# Patient Record
Sex: MALE | Race: WHITE
[De-identification: names, ages, dates, MRNs, and addresses within clinical notes are randomized per-mention and may not be internally consistent; named-entity substitution may affect disease eponyms.]

---

## 2017-01-16 ENCOUNTER — HOSPITAL ENCOUNTER (EMERGENCY)
Dept: HOSPITAL 58 - ED | Age: 32
Discharge: HOME | End: 2017-01-16

## 2017-01-16 VITALS — TEMPERATURE: 98.4 F | DIASTOLIC BLOOD PRESSURE: 88 MMHG | SYSTOLIC BLOOD PRESSURE: 145 MMHG

## 2017-01-16 VITALS — BODY MASS INDEX: 22.3 KG/M2

## 2017-01-16 DIAGNOSIS — S63.91XA: ICD-10-CM

## 2017-01-16 DIAGNOSIS — F17.210: ICD-10-CM

## 2017-01-16 DIAGNOSIS — S62.336A: Primary | ICD-10-CM
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
## 2017-01-16 PROCEDURE — 99283 EMERGENCY DEPT VISIT LOW MDM: CPT

## 2017-01-16 NOTE — ED.PDOC
General


ED Provider: 


Dr. GERALDO BARRAGAN





Chief Complaint: Hand Pain/Injury


Stated Complaint: right hand, wrist pain


Time Seen by Physician: 16:14 (fall)


Mode of Arrival: Walk-In


Information Source: Patient


Nursing and Triage Documentation Reviewed and Agree: Yes





Review of Systems





- Review Of Systems


Constitutional: Reports: No symptoms


Eyes: Reports: No symptoms


Ears, Nose, Mouth, Throat: Reports: No symptoms


Respiratory: Reports: No symptoms


Cardiac: Reports: No symptoms


GI: Reports: No symptoms


: Reports: No symptoms


Musculoskeletal: Reports: Joint pain (right hand , wrist)


Skin: Reports: No symptoms


Neurological: Reports: No symptoms


Endocrine: Reports: No symptoms


Hematologic/Lymphatic: Reports: No symptoms


All Other Systems: Reviewed and Negative





Past Medical History





- Past Medical History


Previously Healthy: Yes


Endocrine: Reports: None


Cardiovascular: Reports: None


Respiratory: Reports: None


Hematological: Reports: None


Gastrointestinal: Reports: None


Genitourinary: Reports: None


Neuro/Psych: Reports: None


Musculoskeletal: Reports: None


Cancer: Reports: None





- Surgical History


General Surgical History: Reports: None





- Family History


Family History: Reports: Unknown





- Social History


Smoking Status: Current some day smoker


Hx Substance Use: No


Alcohol Screening: None





- Immunizations


Tetanus Shot up to Date: Yes





Physical Exam





- Physical Exam


Appearance: Well-appearing, No pain distress, Well-nourished


Eyes: MELISSA, EOMI, Conjunctiva clear


ENT: Ears normal, Nose normal, Oropharynx normal


Respiratory: Airway patent, Breath sounds clear, Breath sounds equal, 

Respirations nonlabored


Cardiovascular: RRR, Pulses normal, No rub, No murmur


GI/: Soft, Nontender, No masses, Bowel sounds normal, No Organomegaly


Musculoskeletal: Limited ROM (4th, 5th  digits tender limited range of motion , 

right elbow and shoulder are WNL)


Skin: Warm, Dry, Normal color


Neurological: Sensation intact, Motor intact, Reflexes intact, Cranial nerves 

intact, Alert, Oriented


Psychiatric: Affect appropriate, Mood appropriate





Critical Care Note





- Critical Care Note


Total Time (mins): 0





Course





- Course


Orders, Labs, Meds: 


Orders











 Category Date Time Status


 


 HAND, RIGHT 3 VIEWS Stat RADS  01/16/17 16:42 Ordered


 


 WRIST, RIGHT 3 VIEWS Stat RADS  01/16/17 16:42 Ordered











Vital Signs: 


 











  Temp Pulse Resp BP Pulse Ox


 


 01/16/17 16:14  98.4 F  87  16  145/88 H  98














Departure





- Departure


Time of Disposition: 18:00 (BOXERS FRACTURE DISCUSSED AND FILM SHOWN TO PT 

FERMÍN GARCIA PRESENT ALONG WITH MARILYNN PAULINO GIVEN)


Disposition: HOME SELF-CARE


Discharge Problem: 


 Injury of hand, Boxer's fracture





Sprain of hand, right


Qualifiers:


 Encounter type: initial encounter Qualifier Code: (S63.91XA) Sprain of 

unspecified part of right wrist and hand, initial encounter





Sprain of wrist, right


Qualifiers:


 Encounter type: initial encounter Qualifier Code: (S63.501A) Unspecified 

sprain of right wrist, initial encounter





Instructions:  Hand Sprain (ED), Wrist Sprain (ED), Wrist Fracture in Adults (ED

)


Condition: Good


Pt referred to PMD for follow-up: No


Additional Instructions: 


Please call your Family Physician as soon as possible to schedule a follow-up 

appointment.YOU MUST SEE CLINIC ASAP  YOU HAVE A BOXERS TYPE FRACTURE


Prescriptions: 


Hydrocodone/Acetaminophen [Norco 5-325 Tablet] 1 each PO Q6HR PRN #12 tablet


 PRN Reason: PAIN


Allergies/Adverse Reactions: 


Allergies





No Known Allergies Allergy (Unverified 10/21/14 06:27)


 








Home Medications: 


Ambulatory Orders





Hydrocodone/Acetaminophen [Norco 5-325 Tablet] 1 each PO Q6HR PRN #12 tablet 01/ 16/17

## 2017-01-17 NOTE — DI
EXAM:  Right hand three-view 

  

HISTORY:  Trauma, fall 

  

COMPARISON:  None 

  

FINDINGS:  There is a mildly displaced fracture of the proximal aspect fifth metacarpal with mild vo
lar angulation of the distal fracture fragment. Remainder of the bones and joints are normal. There 
is soft tissue swelling laterally. 

  

IMPERSSION:  Mildly displaced fracture of the fifth metacarpal. 

  

Report faxed at time of dictation.

## 2017-01-17 NOTE — DI
EXAM:  Right wrist three-view 

  

HISTORY:  Fall 

  

COMPARISON:  None 

  

FINDINGS:  There is a mildly displaced fracture of the proximal aspect fifth metacarpal with mild vo
lar angulation of the distal fracture fragment. Remainder of the bones and joints are normal. There 
is soft tissue swelling laterally. 

  

IMPERSSION:  Mildly displaced fracture of the fifth metacarpal.

## 2017-09-16 ENCOUNTER — HOSPITAL ENCOUNTER (EMERGENCY)
Dept: HOSPITAL 58 - ED | Age: 32
Discharge: HOME | End: 2017-09-16

## 2017-09-16 VITALS — BODY MASS INDEX: 20 KG/M2

## 2017-09-16 VITALS — SYSTOLIC BLOOD PRESSURE: 113 MMHG | DIASTOLIC BLOOD PRESSURE: 52 MMHG | TEMPERATURE: 98.9 F

## 2017-09-16 DIAGNOSIS — J20.9: Primary | ICD-10-CM

## 2017-09-16 DIAGNOSIS — J06.9: ICD-10-CM

## 2017-09-16 DIAGNOSIS — F17.210: ICD-10-CM

## 2017-09-16 LAB
ALBUMIN SERPL-MCNC: 4.6 G/DL (ref 3.4–5)
ALBUMIN/GLOB SERPL: 1.39 {RATIO}
ALP SERPL-CCNC: 85 U/L (ref 50–136)
ALT SERPL-CCNC: 12 U/L (ref 12–78)
ANION GAP SERPL CALC-SCNC: 17.6 MMOL/L
AST SERPL-CCNC: 15 U/L (ref 15–37)
BASOPHILS # BLD AUTO: 0 K/UL (ref 0–0.2)
BASOPHILS NFR BLD AUTO: 0.5 % (ref 0–3)
BILIRUB SERPL-MCNC: 0.27 MG/DL (ref 0–1.2)
BUN SERPL-MCNC: 10 MG/DL (ref 7–18)
BUN/CREAT SERPL: 8.13
CALCIUM SERPL-MCNC: 10.5 MG/DL (ref 8.2–10.2)
CHLORIDE SERPL-SCNC: 102 MMOL/L (ref 98–107)
CO2 BLD-SCNC: 23 MMOL/L (ref 21–32)
CREAT SERPL-MCNC: 1.23 MG/DL (ref 0.6–1.1)
EOSINOPHIL # BLD AUTO: 0.4 K/UL (ref 0–0.7)
EOSINOPHIL NFR BLD AUTO: 5 % (ref 0–7)
GFR SERPLBLD BASED ON 1.73 SQ M-ARVRAT: 70 ML/MIN
GLOBULIN SER CALC-MCNC: 3.3 G/L
GLUCOSE SERPL-MCNC: 103 MG/DL (ref 70–100)
HCT VFR BLD AUTO: 43.7 % (ref 42–52)
HGB BLD-MCNC: 15.5 G/DL (ref 14–18)
IMM GRANULOCYTES NFR BLD AUTO: 0.1 % (ref 0–5)
LYMPHOCYTES # SPEC AUTO: 2.2 K/UL (ref 0.6–3.4)
LYMPHOCYTES NFR BLD AUTO: 29.3 % (ref 10–50)
MCH RBC QN: 31.5 PG (ref 27–31)
MCHC RBC AUTO-ENTMCNC: 35.5 G/DL (ref 31.8–35.4)
MCV RBC: 88.8 FL (ref 80–94)
MONOCYTES # BLD AUTO: 1 K/UL (ref 0.4–2)
MONOCYTES NFR BLD AUTO: 13 % (ref 0–10)
NEUTROPHILS # BLD AUTO: 3.9 K/UL (ref 2–6.9)
NEUTROPHILS NFR BLD AUTO: 52.1 %
PLATELET # BLD AUTO: 178 10^3/UL (ref 140–440)
POTASSIUM SERPL-SCNC: 3.6 MMOL/L (ref 3.5–5.1)
PROT SERPL-MCNC: 7.9 G/DL (ref 6.4–8.2)
RBC # BLD AUTO: 4.92 10^6/UL (ref 4.7–6.1)
SODIUM SERPL-SCNC: 139 MMOL/L (ref 136–145)
WBC # BLD AUTO: 7.54 K/UL (ref 4.2–10.2)

## 2017-09-16 PROCEDURE — 80053 COMPREHEN METABOLIC PANEL: CPT

## 2017-09-16 PROCEDURE — 99283 EMERGENCY DEPT VISIT LOW MDM: CPT

## 2017-09-16 PROCEDURE — 36415 COLL VENOUS BLD VENIPUNCTURE: CPT

## 2017-09-16 PROCEDURE — 85025 COMPLETE CBC W/AUTO DIFF WBC: CPT

## 2017-09-16 PROCEDURE — 94640 AIRWAY INHALATION TREATMENT: CPT

## 2017-09-16 PROCEDURE — 96374 THER/PROPH/DIAG INJ IV PUSH: CPT

## 2017-09-16 NOTE — ED.PDOC
General


ED Provider: 


Dr. FRANCIS ESTRADA





Chief Complaint: Cough


Stated Complaint: Pateint is a 31 year old male who states he has a history of 

anxiety comes to the Er with Head cold and conjestion starting yesterday. This 

morning he felt worse with difficulty breathing. Used sons inhailer but did not 

imrpove. Admits to smoking.


Time Seen by Physician: 10:45


Mode of Arrival: Walk-In


Information Source: Patient


Exam Limitations: No limitations


Nursing and Triage Documentation Reviewed and Agree: Yes





Review of Systems





- Review Of Systems


Constitutional: Reports: Chills, Sweats


Eyes: Reports: No symptoms


Ears, Nose, Mouth, Throat: Reports: No symptoms


Respiratory: Reports: Cough, Short of air, Wheezing


Cardiac: Reports: No symptoms


GI: Reports: No symptoms


: Reports: No symptoms


Musculoskeletal: Reports: No symptoms


Skin: Reports: No symptoms


Neurological: Reports: Anxiety


Endocrine: Reports: No symptoms


Hematologic/Lymphatic: Reports: No symptoms


All Other Systems: Reviewed and Negative





Past Medical History





- Past Medical History


Previously Healthy: Yes


Endocrine: Reports: None


Cardiovascular: Reports: None


Respiratory: Reports: None


Hematological: Reports: None


Gastrointestinal: Reports: None


Genitourinary: Reports: None


Neuro/Psych: Reports: None


Musculoskeletal: Reports: None


Cancer: Reports: None





- Surgical History


General Surgical History: Reports: None





- Family History


Family History: Reports: Unknown





- Social History


Smoking Status: Current every day smoker, Light tobacco smoker


Hx Substance Use: No


Alcohol Screening: None





Physical Exam





- Physical Exam


Appearance: Ill-appearing, Thin


Eyes: MELISSA, EOMI, Conjunctiva clear


Neck: Supple


Respiratory: Breath sounds equal, Breath sounds diminished, Respirations 

nonlabored, Wheezes


Cardiovascular: Pulses normal, No rub, No murmur, Tachycardia


GI/: Soft, Nontender, No masses, Bowel sounds normal, No Organomegaly


Musculoskeletal: Normal strength, ROM intact, No edema, No calf tenderness


Skin: Warm, Dry, Normal color


Neurological: Sensation intact, Motor intact, Cranial nerves intact, Alert, 

Oriented


Psychiatric: Affect appropriate, Mood appropriate





Critical Care Note





- Critical Care Note


Total Time (mins): 0





Course





- Course


Hematology/Chemistry: 


 09/16/17 11:05





 09/16/17 11:05


Orders, Labs, Meds: 


Lab Review











  09/16/17 09/16/17





  11:05 11:05


 


WBC  7.54 


 


RBC  4.92 


 


Hgb  15.5 


 


Hct  43.7 


 


MCV  88.8 


 


MCH  31.5 H 


 


MCHC  35.5 H 


 


RDW Coeff of Anjali  12.9 


 


Plt Count  178 


 


Immature Gran % (Auto)  0.1 


 


Neut % (Auto)  52.1 


 


Lymph % (Auto)  29.3 


 


Mono % (Auto)  13.0 H 


 


Eos % (Auto)  5.0 


 


Baso % (Auto)  0.5 


 


Immature Gran # (Auto)  0.0 


 


Neut #  3.9 


 


Lymph #  2.2 


 


Mono #  1.0 


 


Eos #  0.4 


 


Baso #  0.0 


 


Sodium   139


 


Potassium   3.6


 


Chloride   102


 


Carbon Dioxide   23


 


Anion Gap   17.6


 


BUN   10


 


Creatinine   1.23 H


 


Estimated GFR (MDRD)   70.00


 


BUN/Creatinine Ratio   8.13


 


Glucose   103 H


 


Calcium   10.5 H


 


Total Bilirubin   0.27


 


AST   15


 


ALT   12


 


Alkaline Phosphatase   85


 


Total Protein   7.9


 


Albumin   4.6


 


Globulin   3.3


 


Albumin/Globulin Ratio   1.39








Orders











 Category Date Time Status


 


 NEBULIZER TREATMENT Stat CARDIO  09/16/17 10:55 Completed


 


 CBC W/ AUTO DIFF Stat LAB  09/16/17 11:05 Completed


 


 COMPREHENSIVE METABOLIC PANEL Stat LAB  09/16/17 11:05 Completed


 


 Ipratropium/Albuterol Neb [Duoneb] MEDS  09/16/17 10:54 Discontinued





 1 vial NEB ONCE STA   


 


 Methylprednisolone Sod Succ/Pf [Solu-Medrol 125 mg] MEDS  09/16/17 10:54 

Discontinued





 125 mg IVP ONCE STA   


 


 CHEST, 2 VIEWS PA & LAT Stat RADS  09/16/17 10:57 Completed








Medications














Discontinued Medications














Generic Name Dose Route Start Last Admin





  Trade Name Freq  PRN Reason Stop Dose Admin


 


Albuterol/Ipratropium  1 vial  09/16/17 10:54  09/16/17 11:05





  Duoneb  NEB  09/16/17 10:55  1 vial





  ONCE STA   Administration


 


Methylprednisolone Sodium Succinate  125 mg  09/16/17 10:54  09/16/17 11:16





  Solu-Medrol 125 Mg  IVP  09/16/17 10:55  125 mg





  ONCE STA   Administration











Vital Signs: 


 











  Temp Pulse Resp BP Pulse Ox


 


 09/16/17 10:30  98.9 F  119 H  24  113/52 L  99














Departure





- Departure


Time of Disposition: 11:58


Disposition: HOME SELF-CARE


Discharge Problem: 


 Bronchitis





URI (upper respiratory infection)


Qualifiers:


 URI type: unspecified viral URI Qualified Code(s): J06.9 - Acute upper 

respiratory infection, unspecified





Instructions:  How to Stop Smoking (ED), Acute Bronchitis (ED)


Condition: Fair


Pt referred to PMD for follow-up: Yes


Additional Instructions: 


STOP smoking


Take medications as prescribed 


Follow up with PCP in 3 days. 


Prescriptions: 


Albuterol Sulfate [Proair Hfa] 2 puff IH Q6H PRN #1 puff


 PRN Reason: Wheezing


Azithromycin [Zithromax] 250 mg PO AS DIRECTED #6 tablet


Methylprednisolone [Medrol Dosepak] 4 mg PO AS DIRECTED #1 pkg


Allergies/Adverse Reactions: 


Allergies





No Known Allergies Allergy (Verified 09/16/17 10:37)


 








Home Medications: 


Ambulatory Orders





Albuterol Sulfate [Proair Hfa] 2 puff IH Q6H PRN #1 puff 09/16/17 


Azithromycin [Zithromax] 250 mg PO AS DIRECTED #6 tablet 09/16/17 


Methylprednisolone [Medrol Dosepak] 4 mg PO AS DIRECTED #1 pkg 09/16/17 








Disposition Discussed With: Patient

## 2017-09-16 NOTE — DI
EXAM: PA and lateral views of the chest 

  

HISTORY:  Short of breath 

  

COMPARISON:  Chest Xray from 04/01/2008 

  

FINDINGS:  Lungs are clear with no lobar consolidation, failure, large effusion or significant atelec
tasis.  Cardiac and mediastinal silhouettes are unremarkable. No acute osseous or soft tissue abnorma
lities. 

  

IMPRESSION: No active disease.

## 2017-09-19 ENCOUNTER — HOSPITAL ENCOUNTER (EMERGENCY)
Facility: HOSPITAL | Age: 32
Discharge: HOME | End: 2017-09-19
Attending: EMERGENCY MEDICINE | Admitting: EMERGENCY MEDICINE

## 2017-09-19 ENCOUNTER — APPOINTMENT (OUTPATIENT)
Dept: GENERAL RADIOLOGY | Facility: HOSPITAL | Age: 32
End: 2017-09-19

## 2017-09-19 VITALS
BODY MASS INDEX: 19.32 KG/M2 | SYSTOLIC BLOOD PRESSURE: 131 MMHG | OXYGEN SATURATION: 95 % | TEMPERATURE: 99.3 F | WEIGHT: 138 LBS | DIASTOLIC BLOOD PRESSURE: 70 MMHG | HEART RATE: 83 BPM | HEIGHT: 71 IN | RESPIRATION RATE: 18 BRPM

## 2017-09-19 DIAGNOSIS — J20.9 ACUTE BRONCHITIS, UNSPECIFIED ORGANISM: Primary | ICD-10-CM

## 2017-09-19 LAB
ALBUMIN SERPL-MCNC: 5.3 G/DL (ref 3.5–5)
ALBUMIN/GLOB SERPL: 2 G/DL (ref 1.1–2.5)
ALP SERPL-CCNC: 79 U/L (ref 24–120)
ALT SERPL W P-5'-P-CCNC: 27 U/L (ref 0–54)
AMPHET+METHAMPHET UR QL: NEGATIVE
ANION GAP SERPL CALCULATED.3IONS-SCNC: 14 MMOL/L (ref 4–13)
AST SERPL-CCNC: 24 U/L (ref 7–45)
BARBITURATES UR QL SCN: NEGATIVE
BASOPHILS # BLD AUTO: 0.01 10*3/MM3 (ref 0–0.2)
BASOPHILS NFR BLD AUTO: 0.1 % (ref 0–2)
BENZODIAZ UR QL SCN: NEGATIVE
BILIRUB SERPL-MCNC: 0.6 MG/DL (ref 0.1–1)
BUN BLD-MCNC: 18 MG/DL (ref 5–21)
BUN/CREAT SERPL: 17.3 (ref 7–25)
CALCIUM SPEC-SCNC: 10.7 MG/DL (ref 8.4–10.4)
CANNABINOIDS SERPL QL: POSITIVE
CHLORIDE SERPL-SCNC: 105 MMOL/L (ref 98–110)
CO2 SERPL-SCNC: 21 MMOL/L (ref 24–31)
COCAINE UR QL: NEGATIVE
CREAT BLD-MCNC: 1.04 MG/DL (ref 0.5–1.4)
D DIMER PPP FEU-MCNC: <0.22 MG/L (FEU) (ref 0–0.5)
DEPRECATED RDW RBC AUTO: 43.8 FL (ref 40–54)
EOSINOPHIL # BLD AUTO: 0.03 10*3/MM3 (ref 0–0.7)
EOSINOPHIL NFR BLD AUTO: 0.2 % (ref 0–4)
ERYTHROCYTE [DISTWIDTH] IN BLOOD BY AUTOMATED COUNT: 13.6 % (ref 12–15)
GFR SERPL CREATININE-BSD FRML MDRD: 84 ML/MIN/1.73
GLOBULIN UR ELPH-MCNC: 2.7 GM/DL
GLUCOSE BLD-MCNC: 84 MG/DL (ref 70–100)
HCT VFR BLD AUTO: 43.9 % (ref 40–52)
HGB BLD-MCNC: 15.6 G/DL (ref 14–18)
IMM GRANULOCYTES # BLD: 0.06 10*3/MM3 (ref 0–0.03)
IMM GRANULOCYTES NFR BLD: 0.4 % (ref 0–5)
LYMPHOCYTES # BLD AUTO: 3.39 10*3/MM3 (ref 0.72–4.86)
LYMPHOCYTES NFR BLD AUTO: 22.4 % (ref 15–45)
MCH RBC QN AUTO: 31.6 PG (ref 28–32)
MCHC RBC AUTO-ENTMCNC: 35.5 G/DL (ref 33–36)
MCV RBC AUTO: 88.9 FL (ref 82–95)
METHADONE UR QL SCN: NEGATIVE
MONOCYTES # BLD AUTO: 1.23 10*3/MM3 (ref 0.19–1.3)
MONOCYTES NFR BLD AUTO: 8.1 % (ref 4–12)
MYOGLOBIN SERPL-MCNC: 35.2 NG/ML (ref 0–110)
NEUTROPHILS # BLD AUTO: 10.41 10*3/MM3 (ref 1.87–8.4)
NEUTROPHILS NFR BLD AUTO: 68.8 % (ref 39–78)
OPIATES UR QL: NEGATIVE
PCP UR QL SCN: NEGATIVE
PLATELET # BLD AUTO: 236 10*3/MM3 (ref 130–400)
PMV BLD AUTO: 11 FL (ref 6–12)
POTASSIUM BLD-SCNC: 3.8 MMOL/L (ref 3.5–5.3)
PROT SERPL-MCNC: 8 G/DL (ref 6.3–8.7)
RBC # BLD AUTO: 4.94 10*6/MM3 (ref 4.8–5.9)
SODIUM BLD-SCNC: 140 MMOL/L (ref 135–145)
TROPONIN I SERPL-MCNC: <0.012 NG/ML (ref 0–0.03)
WBC NRBC COR # BLD: 15.13 10*3/MM3 (ref 4.8–10.8)

## 2017-09-19 PROCEDURE — 80307 DRUG TEST PRSMV CHEM ANLYZR: CPT | Performed by: EMERGENCY MEDICINE

## 2017-09-19 PROCEDURE — 93010 ELECTROCARDIOGRAM REPORT: CPT | Performed by: INTERNAL MEDICINE

## 2017-09-19 PROCEDURE — 85025 COMPLETE CBC W/AUTO DIFF WBC: CPT | Performed by: EMERGENCY MEDICINE

## 2017-09-19 PROCEDURE — 80053 COMPREHEN METABOLIC PANEL: CPT | Performed by: EMERGENCY MEDICINE

## 2017-09-19 PROCEDURE — 83874 ASSAY OF MYOGLOBIN: CPT | Performed by: EMERGENCY MEDICINE

## 2017-09-19 PROCEDURE — 71010 HC CHEST PA OR AP: CPT

## 2017-09-19 PROCEDURE — 99284 EMERGENCY DEPT VISIT MOD MDM: CPT

## 2017-09-19 PROCEDURE — 93005 ELECTROCARDIOGRAM TRACING: CPT | Performed by: EMERGENCY MEDICINE

## 2017-09-19 PROCEDURE — 84484 ASSAY OF TROPONIN QUANT: CPT | Performed by: EMERGENCY MEDICINE

## 2017-09-19 PROCEDURE — 85379 FIBRIN DEGRADATION QUANT: CPT | Performed by: EMERGENCY MEDICINE

## 2017-09-19 RX ORDER — METHYLPREDNISOLONE 4 MG/1
4 TABLET ORAL DAILY
COMMUNITY

## 2017-09-19 RX ORDER — AZITHROMYCIN 250 MG/1
250 TABLET, FILM COATED ORAL DAILY
COMMUNITY

## 2017-09-19 NOTE — ED PROVIDER NOTES
Subjective   Patient is a 31 y.o. male presenting with shortness of breath.   Shortness of Breath   Severity:  Moderate  Onset quality:  Gradual  Timing:  Constant  Progression:  Worsening  Chronicity:  New  Context: activity    Context: not animal exposure, not emotional upset, not fumes, not pollens, not smoke exposure, not URI and not weather changes    Relieved by:  Nothing  Worsened by:  Deep breathing  Ineffective treatments:  None tried  Associated symptoms: no abdominal pain, no chest pain, no claudication, no cough, no ear pain, no headaches, no hemoptysis, no neck pain, no PND, no rash, no sore throat, no sputum production, no syncope and no swollen glands    Risk factors: tobacco use    Risk factors: no recent alcohol use and no obesity        Review of Systems   Constitutional: Negative.    HENT: Negative.  Negative for ear pain and sore throat.    Respiratory: Positive for shortness of breath. Negative for cough, hemoptysis and sputum production.    Cardiovascular: Negative for chest pain, claudication, syncope and PND.   Gastrointestinal: Negative.  Negative for abdominal distention, abdominal pain and nausea.   Endocrine: Negative.    Genitourinary: Negative.    Musculoskeletal: Negative.  Negative for back pain and neck pain.   Skin: Negative for color change, pallor and rash.   Neurological: Negative.  Negative for syncope, weakness, light-headedness, numbness and headaches.   Hematological: Negative.  Does not bruise/bleed easily.   All other systems reviewed and are negative.      History reviewed. No pertinent past medical history.    No Known Allergies    Past Surgical History:   Procedure Laterality Date   • TONSILLECTOMY         History reviewed. No pertinent family history.    Social History     Social History   • Marital status:      Spouse name: N/A   • Number of children: N/A   • Years of education: N/A     Social History Main Topics   • Smoking status: Current Every Day Smoker   •  Smokeless tobacco: None   • Alcohol use No   • Drug use: No   • Sexual activity: Defer     Other Topics Concern   • None     Social History Narrative   • None           Objective   Physical Exam   Constitutional: He is oriented to person, place, and time. He appears well-developed.  Non-toxic appearance.   HENT:   Head: Normocephalic and atraumatic.   Mouth/Throat: Uvula is midline and mucous membranes are normal.   Eyes: Conjunctivae and lids are normal. Pupils are equal, round, and reactive to light. Lids are everted and swept, no foreign bodies found.   Neck: Trachea normal, normal range of motion, full passive range of motion without pain and phonation normal. Neck supple. Normal carotid pulses and no JVD present. Carotid bruit is not present. No rigidity. No tracheal deviation present.   Cardiovascular: Normal rate, regular rhythm, normal heart sounds, intact distal pulses and normal pulses.  PMI is not displaced.    Pulmonary/Chest: Effort normal. No accessory muscle usage or stridor. No apnea and no tachypnea. He has no decreased breath sounds. He has wheezes. He has no rhonchi. He has no rales.   Abdominal: Soft. Normal appearance, normal aorta and bowel sounds are normal. There is no hepatosplenomegaly. There is no tenderness.   Musculoskeletal: Normal range of motion.   Lower extremity exam bilaterally is unremarkable.  There is no right or left calf tenderness .  There is no palpable venous cord.  No obvious difference in the size of the legs.  No pitting edema.  The dorsalis pedis and posterior tibial femoral and popliteal pulses are palpable and +2 bilaterally.  Homans sign is negative       Vascular Status -  His exam exhibits right foot vasculature normal. His exam exhibits no right foot edema. His exam exhibits left foot vasculature normal. His exam exhibits no left foot edema.  Neurological: He is alert and oriented to person, place, and time. He has normal strength and normal reflexes. He displays  normal reflexes. No cranial nerve deficit or sensory deficit. Gait normal. GCS eye subscore is 4. GCS verbal subscore is 5. GCS motor subscore is 6.   Skin: Skin is warm, dry and intact. No cyanosis. No pallor. Nails show no clubbing.   Psychiatric: He has a normal mood and affect. His speech is normal and behavior is normal.   Nursing note and vitals reviewed.      Procedures         ED Course  ED Course   Comment By Time   nsr Ankur Alvarez MD 09/19 1314   X-rays are negative I have discussed this with the patient and he already of antibiotics and steroids needs to follow-up with his primary M.D. for Ankur Alvarez MD 09/19 [id number]                  [facility]    Final diagnoses:   Acute bronchitis, unspecified organism            Ankur Alvarez MD  09/19/17 [id number]

## 2017-09-21 ENCOUNTER — HOSPITAL ENCOUNTER (EMERGENCY)
Dept: HOSPITAL 58 - ED | Age: 32
Discharge: HOME | End: 2017-09-21

## 2017-09-21 VITALS — DIASTOLIC BLOOD PRESSURE: 89 MMHG | SYSTOLIC BLOOD PRESSURE: 132 MMHG | TEMPERATURE: 99.6 F

## 2017-09-21 VITALS — BODY MASS INDEX: 19 KG/M2

## 2017-09-21 DIAGNOSIS — F17.210: ICD-10-CM

## 2017-09-21 DIAGNOSIS — R06.02: Primary | ICD-10-CM

## 2017-09-21 DIAGNOSIS — R05: ICD-10-CM

## 2017-09-21 LAB
ADD URINE MICROSCOPIC: YES
ALBUMIN SERPL-MCNC: 4.7 G/DL (ref 3.4–5)
ALBUMIN/GLOB SERPL: 1.52 {RATIO}
ALP SERPL-CCNC: 69 U/L (ref 50–136)
ALT SERPL-CCNC: 19 U/L (ref 12–78)
ANION GAP SERPL CALC-SCNC: 16.5 MMOL/L
ARTERIAL PATENCY WRIST A: (no result)
AST SERPL-CCNC: 16 U/L (ref 15–37)
BASE EXCESS STD BLDA CALC-SCNC: -3 MMOL/L (ref -2–2)
BASE EXCESS STD BLDA CALC-SCNC: -4 MMOL/L (ref -2–2)
BASOPHILS # BLD AUTO: 0 K/UL (ref 0–0.2)
BASOPHILS NFR BLD AUTO: 0.2 % (ref 0–3)
BILIRUB SERPL-MCNC: 0.83 MG/DL (ref 0–1.2)
BUN SERPL-MCNC: 18 MG/DL (ref 7–18)
BUN/CREAT SERPL: 14.63
CALCIUM SERPL-MCNC: 10.2 MG/DL (ref 8.2–10.2)
CHLORIDE SERPL-SCNC: 107 MMOL/L (ref 98–107)
CO2 BLD-SCNC: 21 MMOL/L (ref 21–32)
CO2 BLDA CALC-SCNC: 18 MMOL/L (ref 22–28)
CO2 BLDA CALC-SCNC: 18 MMOL/L (ref 22–28)
COCAINE UR QL SCN: NEGATIVE
CREAT SERPL-MCNC: 1.23 MG/DL (ref 0.6–1.1)
EOSINOPHIL # BLD AUTO: 0.1 K/UL (ref 0–0.7)
EOSINOPHIL NFR BLD AUTO: 0.6 % (ref 0–7)
GFR SERPLBLD BASED ON 1.73 SQ M-ARVRAT: 70 ML/MIN
GLOBULIN SER CALC-MCNC: 3.1 G/L
GLUCOSE SERPL-MCNC: 90 MG/DL (ref 70–100)
HCO3 BLDA-SCNC: 17.3 MMOL/L (ref 22–26)
HCO3 BLDA-SCNC: 17.7 MMOL/L (ref 22–26)
HCT VFR BLD AUTO: 42.5 % (ref 42–52)
HGB BLD-MCNC: 15.7 G/DL (ref 14–18)
IMM GRANULOCYTES NFR BLD AUTO: 0.3 % (ref 0–5)
INHALED O2 CONCENTRATION: 21 %
INHALED O2 CONCENTRATION: 21 %
KETONES UR QL: (no result)
LYMPHOCYTES # SPEC AUTO: 2.5 K/UL (ref 0.6–3.4)
LYMPHOCYTES NFR BLD AUTO: 29.2 % (ref 10–50)
MCH RBC QN: 31.4 PG (ref 27–31)
MCHC RBC AUTO-ENTMCNC: 36.9 G/DL (ref 31.8–35.4)
MCV RBC: 85 FL (ref 80–94)
MONOCYTES # BLD AUTO: 0.6 K/UL (ref 0.4–2)
MONOCYTES NFR BLD AUTO: 7.2 % (ref 0–10)
NEUTROPHILS # BLD AUTO: 5.4 K/UL (ref 2–6.9)
NEUTROPHILS NFR BLD AUTO: 62.5 %
PCO2 BLDA: 16.9 MMHG (ref 35–45)
PCO2 BLDA: 18.2 MMHG (ref 35–45)
PH BLDA: 7.59 [PH] (ref 7.35–7.45)
PH BLDA: 7.63 [PH] (ref 7.35–7.45)
PH UR: 7.5 [PH] (ref 5–9)
PLATELET # BLD AUTO: 213 10^3/UL (ref 140–440)
PO2 BLDA: 114 MMHG (ref 85–100)
PO2 BLDA: 41 MMHG (ref 85–100)
POTASSIUM SERPL-SCNC: 3.5 MMOL/L (ref 3.5–5.1)
PROT SERPL-MCNC: 7.8 G/DL (ref 6.4–8.2)
RBC # BLD AUTO: 5 10^6/UL (ref 4.7–6.1)
SAO2 % BLDA FROM PO2: 86 % (ref 95–100)
SAO2 % BLDA FROM PO2: 99 % (ref 95–100)
SODIUM SERPL-SCNC: 141 MMOL/L (ref 136–145)
SP GR UR: 1.01 (ref 1–1.03)
WBC # BLD AUTO: 8.7 K/UL (ref 4.2–10.2)

## 2017-09-21 PROCEDURE — 36415 COLL VENOUS BLD VENIPUNCTURE: CPT

## 2017-09-21 PROCEDURE — 85025 COMPLETE CBC W/AUTO DIFF WBC: CPT

## 2017-09-21 PROCEDURE — 87880 STREP A ASSAY W/OPTIC: CPT

## 2017-09-21 PROCEDURE — 80306 DRUG TEST PRSMV INSTRMNT: CPT

## 2017-09-21 PROCEDURE — 99284 EMERGENCY DEPT VISIT MOD MDM: CPT

## 2017-09-21 PROCEDURE — 93010 ELECTROCARDIOGRAM REPORT: CPT

## 2017-09-21 PROCEDURE — 93005 ELECTROCARDIOGRAM TRACING: CPT

## 2017-09-21 PROCEDURE — 85379 FIBRIN DEGRADATION QUANT: CPT

## 2017-09-21 PROCEDURE — 80053 COMPREHEN METABOLIC PANEL: CPT

## 2017-09-21 PROCEDURE — 81001 URINALYSIS AUTO W/SCOPE: CPT

## 2017-09-21 PROCEDURE — 87651 STREP A DNA AMP PROBE: CPT

## 2017-09-21 PROCEDURE — 82803 BLOOD GASES ANY COMBINATION: CPT

## 2017-09-21 NOTE — ED.PDOC
General


ED Provider: 


Dr. GERALDO BARRAGAN





Chief Complaint: Shortness of Air


Stated Complaint: shortness of breath, anxious


Time Seen by Physician: 12:22 (seen with robbin and sheila  at all times )


Mode of Arrival: Walk-In


Information Source: Patient


Exam Limitations: No limitations


[facility] 


[facility]





Nursing and Triage Documentation Reviewed and Agree: Yes





Respiratory Complaint Exam





- Shortness of Air Complaint/Exam


Onset/Duration: 2 days


Symptoms Are: Still present


Timing: Constant


Initial Severity: Mild


Current Severity: Mild


Aggravating: Reports: None


Alleviating: Reports: Spontaneous resolution


Associated Signs and Symptoms: Reports: Cough


Related History: Reports: Similar episode


History of Healthcare-Acquired Pneumonia: No


Pulmonary Embolism Risk Factors: Reports: None


Cardiac Risk Factors: Reports: None


Pseudomonas Risk Factors: Reports: None


Tuberculosis Risk Factors: Reports: None


Home Oxygen Use: No


Recent Stress Test: No


Recent Echo/LV Function: No


Respiratory Distress: None


Stridor Present: No


Tracheal Deviation: No


Subcutaneous Emphysema: No


Accessory Muscle Use: No


Retractions: Not Present


Diminished Breath Sounds: No


Prolonged Expiratory Phase: No


Unable to Speak Full Sentences: No


Fatigue: No


Leg Swelling: No


Richie's Sign Present: No


Grunting Respirations: No


Kussmaul Respirations: No


Differential Diagnoses: Pulmonary Edema, Pneumonia, Pneumothorax, Pulmonary 

Embolism, Bronchitis, URI





Review of Systems





- Review Of Systems


Constitutional: Reports: No symptoms


Eyes: Reports: No symptoms


Ears, Nose, Mouth, Throat: Reports: No symptoms


Respiratory: Reports: Short of air


Cardiac: Reports: No symptoms


GI: Reports: No symptoms


: Reports: No symptoms


Musculoskeletal: Reports: No symptoms


Skin: Reports: No symptoms


Neurological: Reports: Anxiety


Endocrine: Reports: No symptoms


Hematologic/Lymphatic: Reports: No symptoms


All Other Systems: Reviewed and Negative





Past Medical History





- Past Medical History


Previously Healthy: Yes


Endocrine: Reports: None


Cardiovascular: Reports: None


Respiratory: Reports: None


Hematological: Reports: None


Gastrointestinal: Reports: None


Genitourinary: Reports: None


Neuro/Psych: Reports: None


Musculoskeletal: Reports: None


Cancer: Reports: None





- Surgical History


General Surgical History: Reports: None





- Family History


Family History: Reports: Unknown





- Social History


Smoking Status: Former smoker, Light tobacco smoker


Hx Substance Use: No


Alcohol Screening: Occasionally





Physical Exam





- Physical Exam


Appearance: Well-appearing, No pain distress, Well-nourished


Eyes: MELISSA, EOMI, Conjunctiva clear


ENT: Ears normal, Nose normal, Oropharynx normal


Respiratory: Airway patent, Breath sounds clear, Breath sounds equal, 

Respirations nonlabored


Cardiovascular: RRR, Pulses normal, No rub, No murmur


GI/: Soft, Nontender, No masses, Bowel sounds normal, No Organomegaly


Musculoskeletal: Normal strength, ROM intact, No edema, No calf tenderness


Skin: Warm, Dry, Normal color


Neurological: Sensation intact, Motor intact, Reflexes intact, Cranial nerves 

intact, Alert, Oriented


Psychiatric: Affect appropriate, Mood appropriate





Interpretation





- Radiology Interpretation


Radiology Interpretation By: Radiologist


Radiology Results: Negative


Exam Interpreted: CXR, CT Scan





Re-Evaluation





- Re-Evaluation


Time of Re-Evaluation: 14:00 (mavis at bedside discussed postive drug screen 

for Methamph pt denied using any drugs)


Status: Improved


Vital Signs Stable: Yes


Pain Level: 0


Appearance: NAD


Lungs: Clear


Skin: Warm and Dry


Neuro: Alert and Oriented X3


CV: RRR





- Re-Evaluation


Time of Re-Evaluation: 15:37 (mavis at bedside discussed ct scan results and ABG

)


Status: Improved


Vital Signs Stable: Yes


Pain Level: 0


Appearance: NAD


Skin: Warm and Dry


Neuro: Alert and Oriented X3


CV: RRR





Critical Care Note





- Critical Care Note


Total Time (mins): 0





Course





- Course


Hematology/Chemistry: 


 09/21/17 13:03





 09/21/17 13:03


Orders, Labs, Meds: 





Lab Review











  09/21/17 09/21/17 09/21/17





  12:53 13:03 13:03


 


WBC   8.70 


 


RBC   5.00 


 


Hgb   15.7 


 


Hct   42.5 


 


MCV   85.0 


 


MCH   31.4 H 


 


MCHC   36.9 H 


 


RDW Coeff of Anjali   12.8 


 


Plt Count   213 


 


Immature Gran % (Auto)   0.3 


 


Neut % (Auto)   62.5 


 


Lymph % (Auto)   29.2 


 


Mono % (Auto)   7.2 


 


Eos % (Auto)   0.6 


 


Baso % (Auto)   0.2 


 


Immature Gran # (Auto)   0.0 


 


Neut #   5.4 


 


Lymph #   2.5 


 


Mono #   0.6 


 


Eos #   0.1 


 


Baso #   0.0 


 


D-Dimer (Manual)   


 


Puncture Site  Rrad  


 


O2 Saturation  86.0 L  


 


ABG pH  7.587 H*  


 


ABG pCO2  18.2 L  


 


ABG pO2  41.0 L*  


 


ABG HCO3  17.3 L  


 


ABG Total CO2  18 L  


 


ABG Base Excess  -4 L  


 


Jonah Test  +  


 


FiO2 %  21.0  


 


Sodium    141


 


Potassium    3.5


 


Chloride    107


 


Carbon Dioxide    21


 


Anion Gap    16.5


 


BUN    18


 


Creatinine    1.23 H


 


Estimated GFR (MDRD)    70.00


 


BUN/Creatinine Ratio    14.63


 


Glucose    90


 


Calcium    10.2


 


Total Bilirubin    0.83


 


AST    16


 


ALT    19


 


Alkaline Phosphatase    69


 


Total Protein    7.8


 


Albumin    4.7


 


Globulin    3.1


 


Albumin/Globulin Ratio    1.52


 


Urine Color   


 


Urine Clarity   


 


Urine pH   


 


Ur Specific Gravity   


 


Urine Protein   


 


Urine Glucose (UA)   


 


Urine Ketones   


 


Urine Blood   


 


Urine Nitrite   


 


Urine Bilirubin   


 


Urine Urobilinogen   


 


Ur Leukocyte Esterase   


 


Urine Microscopic RBC   


 


Ur Squamous Epith Cells   


 


Urine Mucus   


 


Urine Opiates Screen   


 


Ur Oxycodone Screen   


 


Urine Methadone Screen   


 


Ur Propoxyphene Screen   


 


Ur Barbiturates Screen   


 


U Tricyclic Antidepress   


 


Ur Phencyclidine Scrn   


 


Ur Amphetamine Screen   


 


U Methamphetamines Scrn   


 


U Benzodiazepines Scrn   


 


Urine Cocaine Screen   


 


U Cannabinoids Screen   














  09/21/17 09/21/17 09/21/17





  13:40 13:40 13:40


 


WBC   


 


RBC   


 


Hgb   


 


Hct   


 


MCV   


 


MCH   


 


MCHC   


 


RDW Coeff of Anjali   


 


Plt Count   


 


Immature Gran % (Auto)   


 


Neut % (Auto)   


 


Lymph % (Auto)   


 


Mono % (Auto)   


 


Eos % (Auto)   


 


Baso % (Auto)   


 


Immature Gran # (Auto)   


 


Neut #   


 


Lymph #   


 


Mono #   


 


Eos #   


 


Baso #   


 


D-Dimer (Manual)   


 


Puncture Site  Rbrach  


 


O2 Saturation  99.0  


 


ABG pH  7.629 H*  


 


ABG pCO2  16.9 L  


 


ABG pO2  114.0 H  


 


ABG HCO3  17.7 L  


 


ABG Total CO2  18 L  


 


ABG Base Excess  -3 L  


 


Jonah Test   


 


FiO2 %  21.0  


 


Sodium   


 


Potassium   


 


Chloride   


 


Carbon Dioxide   


 


Anion Gap   


 


BUN   


 


Creatinine   


 


Estimated GFR (MDRD)   


 


BUN/Creatinine Ratio   


 


Glucose   


 


Calcium   


 


Total Bilirubin   


 


AST   


 


ALT   


 


Alkaline Phosphatase   


 


Total Protein   


 


Albumin   


 


Globulin   


 


Albumin/Globulin Ratio   


 


Urine Color   Yellow 


 


Urine Clarity   Clear 


 


Urine pH   7.5 


 


Ur Specific Gravity   1.015 


 


Urine Protein   Trace 


 


Urine Glucose (UA)   Negative 


 


Urine Ketones   2+ 


 


Urine Blood   Negative 


 


Urine Nitrite   Negative 


 


Urine Bilirubin   Negative 


 


Urine Urobilinogen   0.2 


 


Ur Leukocyte Esterase   Negative 


 


Urine Microscopic RBC   0-2 


 


Ur Squamous Epith Cells   Not present 


 


Urine Mucus   1+ 


 


Urine Opiates Screen    Negative


 


Ur Oxycodone Screen    Negative


 


Urine Methadone Screen    Negative


 


Ur Propoxyphene Screen    Negative


 


Ur Barbiturates Screen    Negative


 


U Tricyclic Antidepress    Negative


 


Ur Phencyclidine Scrn    Negative


 


Ur Amphetamine Screen    Negative


 


U Methamphetamines Scrn    Positive


 


U Benzodiazepines Scrn    Negative


 


Urine Cocaine Screen    Negative


 


U Cannabinoids Screen    Positive














  09/21/17





  14:00


 


WBC 


 


RBC 


 


Hgb 


 


Hct 


 


MCV 


 


MCH 


 


MCHC 


 


RDW Coeff of Anjali 


 


Plt Count 


 


Immature Gran % (Auto) 


 


Neut % (Auto) 


 


Lymph % (Auto) 


 


Mono % (Auto) 


 


Eos % (Auto) 


 


Baso % (Auto) 


 


Immature Gran # (Auto) 


 


Neut # 


 


Lymph # 


 


Mono # 


 


Eos # 


 


Baso # 


 


D-Dimer (Manual)  112.60


 


Puncture Site 


 


O2 Saturation 


 


ABG pH 


 


ABG pCO2 


 


ABG pO2 


 


ABG HCO3 


 


ABG Total CO2 


 


ABG Base Excess 


 


Jonah Test 


 


FiO2 % 


 


Sodium 


 


Potassium 


 


Chloride 


 


Carbon Dioxide 


 


Anion Gap 


 


BUN 


 


Creatinine 


 


Estimated GFR (MDRD) 


 


BUN/Creatinine Ratio 


 


Glucose 


 


Calcium 


 


Total Bilirubin 


 


AST 


 


ALT 


 


Alkaline Phosphatase 


 


Total Protein 


 


Albumin 


 


Globulin 


 


Albumin/Globulin Ratio 


 


Urine Color 


 


Urine Clarity 


 


Urine pH 


 


Ur Specific Gravity 


 


Urine Protein 


 


Urine Glucose (UA) 


 


Urine Ketones 


 


Urine Blood 


 


Urine Nitrite 


 


Urine Bilirubin 


 


Urine Urobilinogen 


 


Ur Leukocyte Esterase 


 


Urine Microscopic RBC 


 


Ur Squamous Epith Cells 


 


Urine Mucus 


 


Urine Opiates Screen 


 


Ur Oxycodone Screen 


 


Urine Methadone Screen 


 


Ur Propoxyphene Screen 


 


Ur Barbiturates Screen 


 


U Tricyclic Antidepress 


 


Ur Phencyclidine Scrn 


 


Ur Amphetamine Screen 


 


U Methamphetamines Scrn 


 


U Benzodiazepines Scrn 


 


Urine Cocaine Screen 


 


U Cannabinoids Screen 








Orders











 Category Date Time Status


 


 ABG DRAW REQUEST Stat CARDIO  09/21/17 12:53 Completed


 


 ABG DRAW REQUEST Stat CARDIO  09/21/17 13:40 Completed


 


 EKG-(ED ONLY) Stat CARDIO  09/21/17 12:53 Completed


 


 IV ACCESS ONCE CARE  09/21/17 14:23 Active


 


 NPO REMINDER: IMAGING ONCE CARE  09/21/17 14:23 Completed


 


 ABG Stat LAB  09/21/17 12:53 Completed


 


 ABG Stat LAB  09/21/17 13:40 Completed


 


 CBC W/ AUTO DIFF Stat LAB  09/21/17 13:03 Completed


 


 COMPREHENSIVE METABOLIC PANEL Stat LAB  09/21/17 13:03 Completed


 


 D-DIMER Stat LAB  09/21/17 14:00 Completed


 


 DRUG SCREEN (RAPID FOR ED) [DRUG SCREEN, URINE, RAPID] LAB  09/21/17 13:40 

Completed





 Stat   


 


 MOLECULAR GROUP A STREP Stat LAB  09/21/17 13:03 Results


 


 STREP SCREEN Stat LAB  09/21/17 13:03 Results


 


 URINALYSIS C & S IF INDICATED Stat LAB  09/21/17 13:40 Completed


 


 CHEST, 2 VIEWS PA & LAT Stat RADS  09/21/17 12:38 Completed


 


 CT CHEST PE PROTOCOL Stat RADS  09/21/17 14:23 Completed











Vital Signs: 





 











  Temp Pulse Resp BP Pulse Ox


 


 09/21/17 12:20  99.6 F  90  22  132/89  100














Departure





- Departure


Time of Disposition: 15:38


Disposition: HOME SELF-CARE


Discharge Problem: 


 Shortness of breath





Instructions:  Dyspnea (ED)


Condition: Good


Pt referred to PMD for follow-up: Yes


Additional Instructions: 


Please call your Family Physician as soon as possible to schedule a follow-up 

appointment.


Allergies/Adverse Reactions: 


Allergies





No Known Allergies Allergy (Verified 09/21/17 12:17)


 








Home Medications: 


Ambulatory Orders





Albuterol Sulfate [Proair Hfa] 2 puff IH Q6H PRN #1 puff 09/16/17 


Guaifenesin/Pseudoephedrne HCl [Mucinex D ER Tablet] 1 each PO 09/21/17

## 2017-09-21 NOTE — CT
EXAM:  CTA of the chest. 

  

History:  Short of breath 

  

Comparison:  Chest radiograph 09/21/2017 

  

Technique:  Multiplanar CT images through the thorax were obtained following administration of IV con
trast.  MIP images and 3-D reconstructions were also acquired. 

  

Findings:  Heart size is normal.  No pericardial effusion.  No pathologically enlarged thoracic lymph
 nodes.  Great vessels are not well opacified but appear unremarkable.  No pulmonary arterial filling
 defects. 

  

No consolidation.  No pleural fluid and no pneumothorax.  No suspicious lung nodules or lung masses. 


  

Within the visualized upper abdomen, no acute findings.  No acute osseous abnormalities. Small round 
sclerotic lesion within the T9 vertebral body is nonspecific but probably represents a bone island 

  

Impression: 

1.  No pulmonary embolism. 

2.  No acute intrathoracic process.

## 2017-09-21 NOTE — DI
EXAM: PA and lateral views of the chest 

  

HISTORY:  Cough. 

  

COMPARISON:  Chest x-ray 09/16/2017 and 04/01/2008 

  

FINDINGS:  The cardiomediastinal silhouette is normal.  There is no pneumothorax or pleural effusion.
  There is no consolidation, nodule or mass.  The osseous structures are unremarkable. 

  

IMPRESSION:  No acute cardiopulmonary process

## 2017-09-28 ENCOUNTER — ANESTHESIA (OUTPATIENT)
Dept: PERIOP | Facility: HOSPITAL | Age: 32
End: 2017-09-28

## 2017-09-28 ENCOUNTER — ANESTHESIA EVENT (OUTPATIENT)
Dept: PERIOP | Facility: HOSPITAL | Age: 32
End: 2017-09-28

## 2017-09-28 ENCOUNTER — HOSPITAL ENCOUNTER (EMERGENCY)
Facility: HOSPITAL | Age: 32
Discharge: HOME | End: 2017-09-28
Attending: SPECIALIST | Admitting: SPECIALIST

## 2017-09-28 ENCOUNTER — APPOINTMENT (OUTPATIENT)
Dept: CT IMAGING | Facility: HOSPITAL | Age: 32
End: 2017-09-28

## 2017-09-28 VITALS
HEART RATE: 81 BPM | WEIGHT: 138 LBS | HEIGHT: 70 IN | TEMPERATURE: 98.6 F | OXYGEN SATURATION: 99 % | RESPIRATION RATE: 16 BRPM | BODY MASS INDEX: 19.76 KG/M2 | SYSTOLIC BLOOD PRESSURE: 126 MMHG | DIASTOLIC BLOOD PRESSURE: 78 MMHG

## 2017-09-28 DIAGNOSIS — R10.31 RIGHT LOWER QUADRANT ABDOMINAL PAIN: ICD-10-CM

## 2017-09-28 DIAGNOSIS — K35.80 ACUTE APPENDICITIS, UNSPECIFIED ACUTE APPENDICITIS TYPE: Primary | ICD-10-CM

## 2017-09-28 LAB
ALBUMIN SERPL-MCNC: 5 G/DL (ref 3.5–5)
ALBUMIN/GLOB SERPL: 1.9 G/DL (ref 1.1–2.5)
ALP SERPL-CCNC: 86 U/L (ref 24–120)
ALT SERPL W P-5'-P-CCNC: 35 U/L (ref 0–54)
AMPHET+METHAMPHET UR QL: NEGATIVE
AMYLASE SERPL-CCNC: 107 U/L (ref 30–110)
ANION GAP SERPL CALCULATED.3IONS-SCNC: 17 MMOL/L (ref 4–13)
AST SERPL-CCNC: 27 U/L (ref 7–45)
BARBITURATES UR QL SCN: NEGATIVE
BASOPHILS # BLD AUTO: 0.03 10*3/MM3 (ref 0–0.2)
BASOPHILS NFR BLD AUTO: 0.2 % (ref 0–2)
BENZODIAZ UR QL SCN: NEGATIVE
BILIRUB SERPL-MCNC: 0.4 MG/DL (ref 0.1–1)
BILIRUB UR QL STRIP: NEGATIVE
BUN BLD-MCNC: 11 MG/DL (ref 5–21)
BUN/CREAT SERPL: 11.5 (ref 7–25)
CALCIUM SPEC-SCNC: 10.1 MG/DL (ref 8.4–10.4)
CANNABINOIDS SERPL QL: POSITIVE
CHLORIDE SERPL-SCNC: 104 MMOL/L (ref 98–110)
CLARITY UR: CLEAR
CO2 SERPL-SCNC: 22 MMOL/L (ref 24–31)
COCAINE UR QL: NEGATIVE
COLOR UR: YELLOW
CREAT BLD-MCNC: 0.96 MG/DL (ref 0.5–1.4)
D-LACTATE SERPL-SCNC: 1.2 MMOL/L (ref 0.5–2)
D-LACTATE SERPL-SCNC: 2.5 MMOL/L (ref 0.5–2)
DEPRECATED RDW RBC AUTO: 42.6 FL (ref 40–54)
EOSINOPHIL # BLD AUTO: 0.22 10*3/MM3 (ref 0–0.7)
EOSINOPHIL NFR BLD AUTO: 1.5 % (ref 0–4)
ERYTHROCYTE [DISTWIDTH] IN BLOOD BY AUTOMATED COUNT: 13.2 % (ref 12–15)
GFR SERPL CREATININE-BSD FRML MDRD: 93 ML/MIN/1.73
GLOBULIN UR ELPH-MCNC: 2.7 GM/DL
GLUCOSE BLD-MCNC: 88 MG/DL (ref 70–100)
GLUCOSE UR STRIP-MCNC: NEGATIVE MG/DL
HCT VFR BLD AUTO: 42.5 % (ref 40–52)
HGB BLD-MCNC: 14.9 G/DL (ref 14–18)
HGB UR QL STRIP.AUTO: NEGATIVE
HOLD SPECIMEN: NORMAL
HOLD SPECIMEN: NORMAL
IMM GRANULOCYTES # BLD: 0.03 10*3/MM3 (ref 0–0.03)
IMM GRANULOCYTES NFR BLD: 0.2 % (ref 0–5)
KETONES UR QL STRIP: NEGATIVE
LEUKOCYTE ESTERASE UR QL STRIP.AUTO: NEGATIVE
LIPASE SERPL-CCNC: 113 U/L (ref 23–203)
LYMPHOCYTES # BLD AUTO: 2.68 10*3/MM3 (ref 0.72–4.86)
LYMPHOCYTES NFR BLD AUTO: 18.2 % (ref 15–45)
MCH RBC QN AUTO: 31.2 PG (ref 28–32)
MCHC RBC AUTO-ENTMCNC: 35.1 G/DL (ref 33–36)
MCV RBC AUTO: 88.9 FL (ref 82–95)
METHADONE UR QL SCN: NEGATIVE
MONOCYTES # BLD AUTO: 0.96 10*3/MM3 (ref 0.19–1.3)
MONOCYTES NFR BLD AUTO: 6.5 % (ref 4–12)
NEUTROPHILS # BLD AUTO: 10.77 10*3/MM3 (ref 1.87–8.4)
NEUTROPHILS NFR BLD AUTO: 73.4 % (ref 39–78)
NITRITE UR QL STRIP: NEGATIVE
OPIATES UR QL: NEGATIVE
PCP UR QL SCN: NEGATIVE
PH UR STRIP.AUTO: 7 [PH] (ref 5–8)
PLATELET # BLD AUTO: 249 10*3/MM3 (ref 130–400)
PMV BLD AUTO: 10.4 FL (ref 6–12)
POTASSIUM BLD-SCNC: 3.4 MMOL/L (ref 3.5–5.3)
PROCALCITONIN SERPL-MCNC: <0.25 NG/ML
PROT SERPL-MCNC: 7.7 G/DL (ref 6.3–8.7)
PROT UR QL STRIP: NEGATIVE
RBC # BLD AUTO: 4.78 10*6/MM3 (ref 4.8–5.9)
SODIUM BLD-SCNC: 143 MMOL/L (ref 135–145)
SP GR UR STRIP: <=1.005 (ref 1–1.03)
UROBILINOGEN UR QL STRIP: NORMAL
WBC NRBC COR # BLD: 14.69 10*3/MM3 (ref 4.8–10.8)
WHOLE BLOOD HOLD SPECIMEN: NORMAL
WHOLE BLOOD HOLD SPECIMEN: NORMAL

## 2017-09-28 PROCEDURE — 25010000002 FENTANYL CITRATE (PF) 250 MCG/5ML SOLUTION: Performed by: NURSE ANESTHETIST, CERTIFIED REGISTERED

## 2017-09-28 PROCEDURE — 25010000002 ONDANSETRON PER 1 MG: Performed by: NURSE ANESTHETIST, CERTIFIED REGISTERED

## 2017-09-28 PROCEDURE — 88304 TISSUE EXAM BY PATHOLOGIST: CPT | Performed by: SPECIALIST

## 2017-09-28 PROCEDURE — 25010000002 ONDANSETRON PER 1 MG: Performed by: NURSE PRACTITIONER

## 2017-09-28 PROCEDURE — 99284 EMERGENCY DEPT VISIT MOD MDM: CPT

## 2017-09-28 PROCEDURE — 25010000002 HYDROMORPHONE PER 4 MG: Performed by: NURSE ANESTHETIST, CERTIFIED REGISTERED

## 2017-09-28 PROCEDURE — 80307 DRUG TEST PRSMV CHEM ANLYZR: CPT | Performed by: NURSE PRACTITIONER

## 2017-09-28 PROCEDURE — 82150 ASSAY OF AMYLASE: CPT | Performed by: NURSE PRACTITIONER

## 2017-09-28 PROCEDURE — 88302 TISSUE EXAM BY PATHOLOGIST: CPT | Performed by: SPECIALIST

## 2017-09-28 PROCEDURE — 83605 ASSAY OF LACTIC ACID: CPT | Performed by: SPECIALIST

## 2017-09-28 PROCEDURE — 74177 CT ABD & PELVIS W/CONTRAST: CPT

## 2017-09-28 PROCEDURE — 25010000002 PROPOFOL 10 MG/ML EMULSION: Performed by: NURSE ANESTHETIST, CERTIFIED REGISTERED

## 2017-09-28 PROCEDURE — 81003 URINALYSIS AUTO W/O SCOPE: CPT | Performed by: NURSE PRACTITIONER

## 2017-09-28 PROCEDURE — 83690 ASSAY OF LIPASE: CPT | Performed by: NURSE PRACTITIONER

## 2017-09-28 PROCEDURE — 83605 ASSAY OF LACTIC ACID: CPT | Performed by: NURSE PRACTITIONER

## 2017-09-28 PROCEDURE — 85025 COMPLETE CBC W/AUTO DIFF WBC: CPT | Performed by: NURSE PRACTITIONER

## 2017-09-28 PROCEDURE — 96361 HYDRATE IV INFUSION ADD-ON: CPT

## 2017-09-28 PROCEDURE — 96374 THER/PROPH/DIAG INJ IV PUSH: CPT

## 2017-09-28 PROCEDURE — 0 IOPAMIDOL 61 % SOLUTION: Performed by: NURSE PRACTITIONER

## 2017-09-28 PROCEDURE — 25010000002 LORAZEPAM PER 2 MG: Performed by: NURSE PRACTITIONER

## 2017-09-28 PROCEDURE — 25010000002 ERTAPENEM PER 500 MG: Performed by: NURSE ANESTHETIST, CERTIFIED REGISTERED

## 2017-09-28 PROCEDURE — 84145 PROCALCITONIN (PCT): CPT | Performed by: NURSE PRACTITIONER

## 2017-09-28 PROCEDURE — 80053 COMPREHEN METABOLIC PANEL: CPT | Performed by: NURSE PRACTITIONER

## 2017-09-28 PROCEDURE — 96375 TX/PRO/DX INJ NEW DRUG ADDON: CPT

## 2017-09-28 RX ORDER — ONDANSETRON 2 MG/ML
4 INJECTION INTRAMUSCULAR; INTRAVENOUS ONCE
Status: COMPLETED | OUTPATIENT
Start: 2017-09-28 | End: 2017-09-28

## 2017-09-28 RX ORDER — HYDROCODONE BITARTRATE AND ACETAMINOPHEN 5; 325 MG/1; MG/1
1-2 TABLET ORAL EVERY 4 HOURS PRN
Qty: 30 TABLET | Refills: 0 | Status: SHIPPED | OUTPATIENT
Start: 2017-09-28

## 2017-09-28 RX ORDER — METOCLOPRAMIDE HYDROCHLORIDE 5 MG/ML
5 INJECTION INTRAMUSCULAR; INTRAVENOUS
Status: DISCONTINUED | OUTPATIENT
Start: 2017-09-28 | End: 2017-09-28 | Stop reason: HOSPADM

## 2017-09-28 RX ORDER — NALOXONE HCL 0.4 MG/ML
0.04 VIAL (ML) INJECTION AS NEEDED
Status: DISCONTINUED | OUTPATIENT
Start: 2017-09-28 | End: 2017-09-28 | Stop reason: HOSPADM

## 2017-09-28 RX ORDER — MEPERIDINE HYDROCHLORIDE 25 MG/ML
12.5 INJECTION INTRAMUSCULAR; INTRAVENOUS; SUBCUTANEOUS
Status: DISCONTINUED | OUTPATIENT
Start: 2017-09-28 | End: 2017-09-28 | Stop reason: HOSPADM

## 2017-09-28 RX ORDER — MIDAZOLAM HYDROCHLORIDE 1 MG/ML
2 INJECTION INTRAMUSCULAR; INTRAVENOUS
Status: CANCELLED | OUTPATIENT
Start: 2017-09-28

## 2017-09-28 RX ORDER — IPRATROPIUM BROMIDE AND ALBUTEROL SULFATE 2.5; .5 MG/3ML; MG/3ML
3 SOLUTION RESPIRATORY (INHALATION) ONCE AS NEEDED
Status: DISCONTINUED | OUTPATIENT
Start: 2017-09-28 | End: 2017-09-28 | Stop reason: HOSPADM

## 2017-09-28 RX ORDER — MIDAZOLAM HYDROCHLORIDE 1 MG/ML
1 INJECTION INTRAMUSCULAR; INTRAVENOUS
Status: CANCELLED | OUTPATIENT
Start: 2017-09-28

## 2017-09-28 RX ORDER — BUPIVACAINE HYDROCHLORIDE AND EPINEPHRINE 2.5; 5 MG/ML; UG/ML
INJECTION, SOLUTION INFILTRATION; PERINEURAL AS NEEDED
Status: DISCONTINUED | OUTPATIENT
Start: 2017-09-28 | End: 2017-09-28 | Stop reason: HOSPADM

## 2017-09-28 RX ORDER — LABETALOL HYDROCHLORIDE 5 MG/ML
5 INJECTION, SOLUTION INTRAVENOUS
Status: DISCONTINUED | OUTPATIENT
Start: 2017-09-28 | End: 2017-09-28 | Stop reason: HOSPADM

## 2017-09-28 RX ORDER — LORAZEPAM 2 MG/ML
1 INJECTION INTRAMUSCULAR ONCE
Status: COMPLETED | OUTPATIENT
Start: 2017-09-28 | End: 2017-09-28

## 2017-09-28 RX ORDER — SODIUM CHLORIDE, SODIUM LACTATE, POTASSIUM CHLORIDE, CALCIUM CHLORIDE 600; 310; 30; 20 MG/100ML; MG/100ML; MG/100ML; MG/100ML
100 INJECTION, SOLUTION INTRAVENOUS CONTINUOUS
Status: CANCELLED | OUTPATIENT
Start: 2017-09-28

## 2017-09-28 RX ORDER — HYDRALAZINE HYDROCHLORIDE 20 MG/ML
5 INJECTION INTRAMUSCULAR; INTRAVENOUS
Status: DISCONTINUED | OUTPATIENT
Start: 2017-09-28 | End: 2017-09-28 | Stop reason: HOSPADM

## 2017-09-28 RX ORDER — MORPHINE SULFATE 2 MG/ML
2 INJECTION, SOLUTION INTRAMUSCULAR; INTRAVENOUS AS NEEDED
Status: DISCONTINUED | OUTPATIENT
Start: 2017-09-28 | End: 2017-09-28 | Stop reason: HOSPADM

## 2017-09-28 RX ORDER — HYDROCODONE BITARTRATE AND ACETAMINOPHEN 5; 325 MG/1; MG/1
1 TABLET ORAL ONCE AS NEEDED
Status: CANCELLED | OUTPATIENT
Start: 2017-09-28 | End: 2017-10-08

## 2017-09-28 RX ORDER — MAGNESIUM HYDROXIDE 1200 MG/15ML
LIQUID ORAL AS NEEDED
Status: DISCONTINUED | OUTPATIENT
Start: 2017-09-28 | End: 2017-09-28 | Stop reason: HOSPADM

## 2017-09-28 RX ORDER — ERTAPENEM 1 G/1
INJECTION, POWDER, LYOPHILIZED, FOR SOLUTION INTRAMUSCULAR; INTRAVENOUS
Status: DISCONTINUED
Start: 2017-09-28 | End: 2017-09-28 | Stop reason: HOSPADM

## 2017-09-28 RX ORDER — SODIUM CHLORIDE 0.9 % (FLUSH) 0.9 %
1-10 SYRINGE (ML) INJECTION AS NEEDED
Status: CANCELLED | OUTPATIENT
Start: 2017-09-28

## 2017-09-28 RX ORDER — ONDANSETRON 2 MG/ML
4 INJECTION INTRAMUSCULAR; INTRAVENOUS AS NEEDED
Status: DISCONTINUED | OUTPATIENT
Start: 2017-09-28 | End: 2017-09-28 | Stop reason: HOSPADM

## 2017-09-28 RX ORDER — SODIUM CHLORIDE 9 MG/ML
INJECTION, SOLUTION INTRAVENOUS AS NEEDED
Status: DISCONTINUED | OUTPATIENT
Start: 2017-09-28 | End: 2017-09-28 | Stop reason: HOSPADM

## 2017-09-28 RX ORDER — ERTAPENEM 1 G/1
INJECTION, POWDER, LYOPHILIZED, FOR SOLUTION INTRAMUSCULAR; INTRAVENOUS AS NEEDED
Status: DISCONTINUED | OUTPATIENT
Start: 2017-09-28 | End: 2017-09-28 | Stop reason: SURG

## 2017-09-28 RX ORDER — FLUMAZENIL 0.1 MG/ML
0.2 INJECTION INTRAVENOUS AS NEEDED
Status: DISCONTINUED | OUTPATIENT
Start: 2017-09-28 | End: 2017-09-28 | Stop reason: HOSPADM

## 2017-09-28 RX ADMIN — ONDANSETRON 4 MG: 2 INJECTION, SOLUTION INTRAMUSCULAR; INTRAVENOUS at 20:05

## 2017-09-28 RX ADMIN — MEPERIDINE HYDROCHLORIDE 25 MG: 25 INJECTION INTRAMUSCULAR; INTRAVENOUS; SUBCUTANEOUS at 20:05

## 2017-09-28 RX ADMIN — ONDANSETRON HYDROCHLORIDE 4 MG: 2 SOLUTION INTRAMUSCULAR; INTRAVENOUS at 19:10

## 2017-09-28 RX ADMIN — SODIUM CHLORIDE 1000 ML: 9 INJECTION, SOLUTION INTRAVENOUS at 16:22

## 2017-09-28 RX ADMIN — ONDANSETRON 4 MG: 2 INJECTION INTRAMUSCULAR; INTRAVENOUS at 16:22

## 2017-09-28 RX ADMIN — LIDOCAINE HYDROCHLORIDE 80 MG: 20 INJECTION, SOLUTION INFILTRATION; PERINEURAL at 18:40

## 2017-09-28 RX ADMIN — PROPOFOL 200 MG: 10 INJECTION, EMULSION INTRAVENOUS at 18:40

## 2017-09-28 RX ADMIN — SODIUM CHLORIDE, POTASSIUM CHLORIDE, SODIUM LACTATE AND CALCIUM CHLORIDE: 600; 310; 30; 20 INJECTION, SOLUTION INTRAVENOUS at 18:37

## 2017-09-28 RX ADMIN — ERTAPENEM SODIUM 1 G: 1 INJECTION, POWDER, LYOPHILIZED, FOR SOLUTION INTRAMUSCULAR; INTRAVENOUS at 18:50

## 2017-09-28 RX ADMIN — IOPAMIDOL 100 ML: 612 INJECTION, SOLUTION INTRAVENOUS at 16:59

## 2017-09-28 RX ADMIN — LORAZEPAM 1 MG: 2 INJECTION INTRAMUSCULAR; INTRAVENOUS at 16:22

## 2017-09-28 RX ADMIN — FENTANYL CITRATE 150 MCG: 50 INJECTION INTRAMUSCULAR; INTRAVENOUS at 18:40

## 2017-09-28 RX ADMIN — HYDROMORPHONE HYDROCHLORIDE 1 MG: 1 INJECTION, SOLUTION INTRAMUSCULAR; INTRAVENOUS; SUBCUTANEOUS at 20:05

## 2017-09-28 NOTE — ANESTHESIA PREPROCEDURE EVALUATION
Anesthesia Evaluation     Patient summary reviewed   NPO Solid Status: > 6 hours  NPO Liquid Status: < 2 hours     Airway   Mallampati: II  TM distance: >3 FB  Neck ROM: full  no difficulty expected  Dental    (+) poor dentition    Pulmonary - normal exam   (+) a smoker Current,   Cardiovascular - negative cardio ROS and normal exam  Exercise tolerance: excellent (>7 METS)        Neuro/Psych  (+) psychiatric history Anxiety,    GI/Hepatic/Renal/Endo - negative ROS     Musculoskeletal (-) negative ROS    Abdominal    Substance History   (+) drug use     OB/GYN          Other            Phys Exam Other: Global decay all teeth. Instructed patient teeth could be damaged..  Lab Results       Component                Value               Date                       WBC                      14.69 (H)           09/28/2017                 HGB                      14.9                09/28/2017                 HCT                      42.5                09/28/2017                 MCV                      88.9                09/28/2017                 PLT                      249                 09/28/2017                                          Anesthesia Plan    ASA 2 - emergent     general     intravenous induction   Anesthetic plan and risks discussed with patient.

## 2017-09-28 NOTE — ANESTHESIA PROCEDURE NOTES
Airway  Urgency: emergent    Airway not difficult    General Information and Staff    Patient location during procedure: OR  CRNA: SYLVIA BALDERAS    Indications and Patient Condition  Indications for airway management: airway protection    Preoxygenated: yes  Mask difficulty assessment: 1 - vent by mask    Final Airway Details  Final airway type: endotracheal airway      Successful airway: ETT    Successful intubation technique: direct laryngoscopy  Endotracheal tube insertion site: oral  Blade: Adenike  Blade size: 3.5.  ETT size: 7.5 mm  Cormack-Lehane Classification: grade I - full view of glottis  Placement verified by: chest auscultation and capnometry   Measured from: gums  Number of attempts at approach: 1

## 2017-09-29 NOTE — ANESTHESIA POSTPROCEDURE EVALUATION
Patient: Femi Wilkins    Procedure Summary     Date Anesthesia Start Anesthesia Stop Room / Location    09/28/17 1837 1930  [facility] OR 09 / [facility] OR       Procedure Diagnosis Surgeon Provider    APPENDECTOMY LAPAROSCOPIC POSSIBLE OPEN (N/A Abdomen) Acute appendicitis, unspecified acute appendicitis type  (Acute appendicitis, unspecified acute appendicitis type [K35.80]) MD Wilder Porter CRNA          Anesthesia Type: general  Last vitals  BP        Temp        Pulse       Resp        SpO2          Post Anesthesia Care and Evaluation    Patient location during evaluation: PACU  Patient participation: complete - patient participated  Level of consciousness: awake and awake and alert  Pain score: 0  Pain management: adequate  Airway patency: patent  Anesthetic complications: No anesthetic complications    Cardiovascular status: acceptable and stable  Respiratory status: acceptable and unassisted  Hydration status: acceptable

## 2017-10-05 LAB
CYTO UR: NORMAL
LAB AP CASE REPORT: NORMAL
Lab: NORMAL
PATH REPORT.FINAL DX SPEC: NORMAL
PATH REPORT.GROSS SPEC: NORMAL

## 2017-10-09 RX ORDER — SODIUM CHLORIDE, SODIUM LACTATE, POTASSIUM CHLORIDE, CALCIUM CHLORIDE 600; 310; 30; 20 MG/100ML; MG/100ML; MG/100ML; MG/100ML
INJECTION, SOLUTION INTRAVENOUS CONTINUOUS PRN
Status: DISCONTINUED | OUTPATIENT
Start: 2017-09-28 | End: 2017-10-09 | Stop reason: SURG

## 2017-10-09 RX ORDER — PROPOFOL 10 MG/ML
VIAL (ML) INTRAVENOUS AS NEEDED
Status: DISCONTINUED | OUTPATIENT
Start: 2017-09-28 | End: 2017-10-09 | Stop reason: SURG

## 2017-10-09 RX ORDER — FENTANYL CITRATE 50 UG/ML
INJECTION, SOLUTION INTRAMUSCULAR; INTRAVENOUS AS NEEDED
Status: DISCONTINUED | OUTPATIENT
Start: 2017-09-28 | End: 2017-10-09 | Stop reason: SURG

## 2017-10-09 RX ORDER — ONDANSETRON 2 MG/ML
INJECTION INTRAMUSCULAR; INTRAVENOUS AS NEEDED
Status: DISCONTINUED | OUTPATIENT
Start: 2017-09-28 | End: 2017-10-09 | Stop reason: SURG

## 2017-10-09 RX ORDER — LIDOCAINE HYDROCHLORIDE 20 MG/ML
INJECTION, SOLUTION INFILTRATION; PERINEURAL AS NEEDED
Status: DISCONTINUED | OUTPATIENT
Start: 2017-09-28 | End: 2017-10-09 | Stop reason: SURG

## 2017-11-12 ENCOUNTER — HOSPITAL ENCOUNTER (OUTPATIENT)
Dept: HOSPITAL 58 - ED | Age: 32
Setting detail: OBSERVATION
LOS: 1 days | Discharge: HOME | End: 2017-11-13
Attending: INTERNAL MEDICINE | Admitting: INTERNAL MEDICINE

## 2017-11-12 VITALS — BODY MASS INDEX: 20.9 KG/M2

## 2017-11-12 DIAGNOSIS — F12.90: ICD-10-CM

## 2017-11-12 DIAGNOSIS — R74.8: ICD-10-CM

## 2017-11-12 DIAGNOSIS — E86.0: ICD-10-CM

## 2017-11-12 DIAGNOSIS — K29.00: Primary | ICD-10-CM

## 2017-11-12 DIAGNOSIS — D64.9: ICD-10-CM

## 2017-11-12 DIAGNOSIS — R10.816: ICD-10-CM

## 2017-11-12 DIAGNOSIS — R13.10: ICD-10-CM

## 2017-11-12 PROCEDURE — 99285 EMERGENCY DEPT VISIT HI MDM: CPT

## 2017-11-12 PROCEDURE — 99217: CPT

## 2017-11-12 PROCEDURE — 36415 COLL VENOUS BLD VENIPUNCTURE: CPT

## 2017-11-12 PROCEDURE — 93010 ELECTROCARDIOGRAM REPORT: CPT

## 2017-11-12 PROCEDURE — 87651 STREP A DNA AMP PROBE: CPT

## 2017-11-12 PROCEDURE — 87880 STREP A ASSAY W/OPTIC: CPT

## 2017-11-12 PROCEDURE — 80053 COMPREHEN METABOLIC PANEL: CPT

## 2017-11-12 PROCEDURE — 80306 DRUG TEST PRSMV INSTRMNT: CPT

## 2017-11-12 PROCEDURE — 87804 INFLUENZA ASSAY W/OPTIC: CPT

## 2017-11-12 PROCEDURE — 96374 THER/PROPH/DIAG INJ IV PUSH: CPT

## 2017-11-12 PROCEDURE — 83605 ASSAY OF LACTIC ACID: CPT

## 2017-11-12 PROCEDURE — 93005 ELECTROCARDIOGRAM TRACING: CPT

## 2017-11-12 PROCEDURE — 85025 COMPLETE CBC W/AUTO DIFF WBC: CPT

## 2017-11-12 PROCEDURE — 87040 BLOOD CULTURE FOR BACTERIA: CPT

## 2017-11-12 PROCEDURE — 96361 HYDRATE IV INFUSION ADD-ON: CPT

## 2017-11-12 PROCEDURE — 99220: CPT

## 2017-11-12 PROCEDURE — 81001 URINALYSIS AUTO W/SCOPE: CPT

## 2017-11-12 PROCEDURE — 84145 PROCALCITONIN (PCT): CPT

## 2017-11-12 RX ADMIN — Medication PRN SYR: at 11:48

## 2017-11-12 RX ADMIN — PANTOPRAZOLE SODIUM SCH MG: 40 INJECTION, POWDER, FOR SOLUTION INTRAVENOUS at 14:05

## 2017-11-12 RX ADMIN — Medication PRN SYR: at 10:52

## 2017-11-12 RX ADMIN — SODIUM CHLORIDE SCH MLS/HR: 0.9 INJECTION, SOLUTION INTRAVENOUS at 14:05

## 2017-11-12 RX ADMIN — SODIUM CHLORIDE SCH MLS/HR: 0.9 INJECTION, SOLUTION INTRAVENOUS at 22:52

## 2017-11-12 NOTE — CT
EXAM:  CT of the chest without contrast. 

  

HISTORY:  Cough. 

  

COMPARISON:  09/21/2017. 

  

TECHNIQUE:  Contiguous axial images were obtained from the lung apices to the upper abdomen.  Study w
as performed without contrast.  Sagittal and coronal reformats were reviewed. 

  

FINDINGS:  Evaluation is limited without contrast. 

  

The lung windows show no lobar consolidation or effusion.  There is no pleural thickening.  The pulmo
nary interstitium appears normal.  There is a calcified granuloma in the left lung base.  No suspicio
us nodules are identified.  The airways are widely patent.  The heart size is within normal limits.  
There is no significant mediastinal or hilar adenopathy.  There is no axillary adenopathy.  Limited v
iews of the upper abdomen are unremarkable.  There are no masses, free fluid or free air. 

  

The osseous structures are normal for age. 

  

IMPRESSION:  No acute pulmonary disease. 

Minimal granulomatous change.

## 2017-11-12 NOTE — CT
EXAM:  Noncontrast CT of the abdomen and pelvis. 

  

HISTORY:  Abdominal pain.  Recent appendectomy. 

  

COMPARISON:  None. 

  

TECHNIQUE:  Contiguous axial images at 3 mm intervals were obtained from lung bases through the pelvi
s.  No contrast was given.  Coronal reformats were reviewed. 

  

FINDINGS:  The study is limited without contrast. 

  

CHEST:  The lung bases show no lobar consolidation or effusion.  The heart size is within normal limi
ts. 

  

ABDOMEN:  Evaluation of the soft tissue organs is limited without contrast. 

LIVER:  Noncontrast images of the liver show no solid mass lesion or intrahepatic ductal dilatation. 


BILIARY:  The gallbladder is normally distended.  No gallstones are noted.  No pericholecystic fluid 
or inflammation.  The common bile duct is normal. 

SPLEEN:  The spleen is unremarkable. 

PANCREAS:  The pancreas shows no mass lesion or peripancreatic inflammation. 

ADRENAL GLANDS:  The adrenal glands are normal. 

RENAL:  The kidneys show no hydronephrosis or nephrolithiasis.  There are no obstructing ureteral sto
heriberto.  No solid mass lesions are identified. 

  

RETROPERITONEUM:  The aorta is unopacified.  No aneurysm is identified.  No significant aortic calcif
ications are seen.  There is no retroperitoneal or mesenteric adenopathy. 

  

BOWEL:  The bowel is unopacified.  There is no obstruction or inflammatory change.  There is no free 
fluid or free air.   No significant inflammatory changes are seen.    The appendix is absent.  There 
is no fluid or inflammation in the right lower quadrant.  No evidence of an abscess. 

  

PELVIS: 

BLADDER:  The bladder is not well distended which limits evaluation.. 

GENITOURINARY STRUCTURES:  The prostate is unremarkable. 

  

OSSEOUS STRUCTURES:  The osseous structures are normal for age. 

  

IMPRESSION 

1.  No acute intra-abdominal abnormality.  Limited study without contrast.  No obstructing ureteral s
tones. 

2.  Post appendectomy.  There are clips in the right lower quadrant.  No fluid, inflammation or evide
nce of abscess. 

3.  Limited evaluation of the bladder due to poor distension.

## 2017-11-12 NOTE — ED.PDOC
General


ED Provider: 


Dr. GERALDO BARRAGAN





Chief Complaint: Nausea/Vomiting


Stated Complaint: abdominal vomiting, diarrhea


Time Seen by Physician: 10:30 (seen with mason )


Mode of Arrival: Walk-In


Information Source: Patient


Exam Limitations: No limitations


[facility] 


[facility]





Nursing and Triage Documentation Reviewed and Agree: Yes





GI Complaint Exam





- Abdominal Pain Complaint/Exam


Onset: Gradual


Duration:  1 day


Symptoms Are: Still present


Timing: Intermittent


Initial Severity: Moderate


Current Severity: Moderate


Location of Pain: Diffuse


Character: Reports: Cramping


Aggravating: Reports: Food


Alleviating: Reports: None


Associated Signs and Symptoms: Reports: Cough, Nausea, Vomiting, Diarrhea.  

Denies: Diaphoresis, Fever, Chest pain, Dizziness, Back pain, Constipation, 

Blood in stool, Dysuria, Urinary frequency, Decreased urine output, Decreased 

appetite, Discharge, Decreased activity


AAA Risk Factors: Reports: None


Cardiac Risk Factors: Reports: None


Testicular Torsion Risk Factors: Reports: None


Surgical Obstruction Risk Factors: Reports: None


Related Surgical History: Reports: None


Abdominal Findings: Present: None


Differential Diagnoses: Appendicitis, Bowel Obstruction, Constipation, 

Gastroenteritis, Pancreatitis





Review of Systems





- Review Of Systems


Constitutional: Reports: Malaise, Weakness


Eyes: Reports: No symptoms


Ears, Nose, Mouth, Throat: Reports: No symptoms


Respiratory: Reports: No symptoms


Cardiac: Reports: No symptoms


GI: Reports: Abdominal pain, Diarrhea, Difficulty swallowing, Nausea


: Reports: No symptoms


Musculoskeletal: Reports: No symptoms


Skin: Reports: No symptoms


Neurological: Reports: No symptoms


Endocrine: Reports: No symptoms


Hematologic/Lymphatic: Reports: No symptoms


All Other Systems: Reviewed and Negative





Past Medical History





- Past Medical History


Previously Healthy: Yes


Endocrine: Reports: None


Cardiovascular: Reports: None


Respiratory: Reports: None


Hematological: Reports: None


Gastrointestinal: Reports: None


Genitourinary: Reports: None


Neuro/Psych: Reports: None


Musculoskeletal: Reports: None


Cancer: Reports: None





- Surgical History


General Surgical History: Reports: None





- Family History


Family History: Reports: Unknown





- Social History


Smoking Status: Former smoker, Light tobacco smoker


Hx Substance Use: No


Alcohol Screening: Occasionally





Physical Exam





- Physical Exam


Appearance: Well-appearing, No pain distress, Well-nourished


Eyes: MELISSA, EOMI, Conjunctiva clear


ENT: Ears normal, Nose normal, Oropharynx normal


Respiratory: Airway patent, Breath sounds clear, Breath sounds equal, 

Respirations nonlabored


Cardiovascular: RRR, Pulses normal, No rub, No murmur


GI/: Soft, Nontender, No masses, Bowel sounds normal, No Organomegaly


Musculoskeletal: Normal strength, ROM intact, No edema, No calf tenderness


Skin: Warm, Dry, Normal color


Neurological: Sensation intact, Motor intact, Reflexes intact, Cranial nerves 

intact, Alert, Oriented


Psychiatric: Affect appropriate, Mood appropriate





Interpretation





- Radiology Interpretation


Radiology Interpretation By: Radiologist


Radiology Results: No acute changes





Physician Notification





- Case Discussed


Physician Notified: pmd


Time of Notification: 12:36


Admit To: Inpatient





Critical Care Note





- Critical Care Note


Total Time (mins): 0





Course





- Course


Hematology/Chemistry: 


 11/12/17 10:50





 11/12/17 10:50


Orders, Labs, Meds: 





Lab Review











  11/12/17 11/12/17 11/12/17





  10:50 10:50 10:50


 


WBC  13.89 H  


 


RBC  4.69 L  


 


Hgb  14.7  


 


Hct  42.2  


 


MCV  90.0  


 


MCH  31.3 H  


 


MCHC  34.8  


 


RDW Coeff of Anjali  13.0  


 


Plt Count  210  


 


Immature Gran % (Auto)  1.5  


 


Neut % (Auto)  75.4  


 


Lymph % (Auto)  15.8  


 


Mono % (Auto)  6.3  


 


Eos % (Auto)  0.4  


 


Baso % (Auto)  0.6  


 


Immature Gran # (Auto)  0.2  


 


Neut #  10.5 H  


 


Lymph #  2.2  


 


Mono #  0.9  


 


Eos #  0.1  


 


Baso #  0.1  


 


Sodium   144 


 


Potassium   4.0 


 


Chloride   103 


 


Carbon Dioxide   30 


 


Anion Gap   15.0 


 


BUN   12 


 


Creatinine   0.99 


 


Estimated GFR (MDRD)   89.00 


 


BUN/Creatinine Ratio   12.12 


 


Glucose   118 H 


 


Lactic Acid   


 


Calcium   9.8 


 


Total Bilirubin   0.33 


 


AST   50 H 


 


ALT   123 H 


 


Alkaline Phosphatase   72 


 


Total Protein   7.5 


 


Albumin   4.4 


 


Globulin   3.1 


 


Albumin/Globulin Ratio   1.42 


 


Procalcitonin    < 0.05


 


Influenza A (Rapid)   


 


Influenza B (Rapid)   














  11/12/17 11/12/17





  10:55 11:00


 


WBC  


 


RBC  


 


Hgb  


 


Hct  


 


MCV  


 


MCH  


 


MCHC  


 


RDW Coeff of Anjali  


 


Plt Count  


 


Immature Gran % (Auto)  


 


Neut % (Auto)  


 


Lymph % (Auto)  


 


Mono % (Auto)  


 


Eos % (Auto)  


 


Baso % (Auto)  


 


Immature Gran # (Auto)  


 


Neut #  


 


Lymph #  


 


Mono #  


 


Eos #  


 


Baso #  


 


Sodium  


 


Potassium  


 


Chloride  


 


Carbon Dioxide  


 


Anion Gap  


 


BUN  


 


Creatinine  


 


Estimated GFR (MDRD)  


 


BUN/Creatinine Ratio  


 


Glucose  


 


Lactic Acid  22.7 H 


 


Calcium  


 


Total Bilirubin  


 


AST  


 


ALT  


 


Alkaline Phosphatase  


 


Total Protein  


 


Albumin  


 


Globulin  


 


Albumin/Globulin Ratio  


 


Procalcitonin  


 


Influenza A (Rapid)   Negative


 


Influenza B (Rapid)   Negative








Orders











 Category Date Time Status


 


 EKG-(ED ONLY) Stat CARDIO  11/12/17 10:42 Completed


 


 ED IV/MEDIPORT/POWERPORT .ONCE EMERGENCY  11/12/17 10:42 Active


 


 BLOOD CULTURE Stat LAB  11/12/17 11:05 Received


 


 CBC W/ AUTO DIFF Stat LAB  11/12/17 10:50 Completed


 


 COMPREHENSIVE METABOLIC PANEL Stat LAB  11/12/17 10:50 Completed


 


 LACTIC ACID Stat LAB  11/12/17 10:55 Completed


 


 MOLECULAR GROUP A STREP Stat LAB  11/12/17 11:00 Results


 


 PROCALCITONIN Stat LAB  11/12/17 10:50 Completed


 


 RAPID FLU A/B Stat LAB  11/12/17 11:00 Completed


 


 STREP SCREEN Stat LAB  11/12/17 11:00 Results


 


 URINALYSIS C & S IF INDICATED Stat LAB  11/12/17 10:42 Uncollected


 


 0.9 % Sodium Chloride [Saline Flush] MEDS  11/12/17 10:42 Active





 1 syr IVF PRN PRN   


 


 Ondansetron HCl/Pf [Zofran 4 mg/2 ml] MEDS  11/12/17 10:40 Discontinued





 4 mg .ROUTE .STK-MED ONE   


 


 Ondansetron HCl/Pf [Zofran 4 mg/2 ml] MEDS  11/12/17 10:43 Discontinued





 4 mg IVP ONCE STA   


 


 Sodium Chloride 0.9% [Sodium Chloride] 1,000 ml MEDS  11/12/17 10:44 

Discontinued





 IV BOLUS   


 


 Sodium Chloride 0.9% [Sodium Chloride] 1,000 ml MEDS  11/12/17 11:46 Active





 IV BOLUS   


 


 CT ABDOMEN/PELVIS WO CONTRAST Stat RADS  11/12/17 10:43 Completed


 


 CT CHEST W/O CONTRAST Stat RADS  11/12/17 10:42 Completed








Medications











Generic Name Dose Route Start Last Admin





  Trade Name Freq  PRN Reason Stop Dose Admin


 


Sodium Chloride  1,000 mls @ 1,000 mls/hr  11/12/17 11:46  11/12/17 11:48





  Sodium Chloride  IV  11/12/17 12:45  1,000 mls/hr





  BOLUS STA   Administration


 


Sodium Chloride  1 syr  11/12/17 10:42  11/12/17 11:48





  Saline Flush  IVF   1 syr





  PRN PRN   Administration





  To flush IV   














Discontinued Medications














Generic Name Dose Route Start Last Admin





  Trade Name Koko  PRN Reason Stop Dose Admin


 


Sodium Chloride  1,000 mls @ 1,000 mls/hr  11/12/17 10:44  11/12/17 10:52





  Sodium Chloride  IV  11/12/17 11:43  1,000 mls/hr





  BOLUS STA   Administration


 


Ondansetron HCl  4 mg  11/12/17 10:43  11/12/17 10:52





  Zofran 4 Mg/2 Ml  IVP  11/12/17 10:44  4 mg





  ONCE STA   Administration











Vital Signs: 





 











  Temp Pulse Resp BP Pulse Ox


 


 11/12/17 10:28  96.2 F L  74  18  107/63  98














Departure





- Departure


Time of Disposition: 12:36


Disposition: ADMITTED AS INPATIENT


Discharge Problem: 


 Nausea, Vomiting, Dehydration





Instructions:  Dehydration (ED)


Condition: Good


Pt referred to PMD for follow-up: Yes


Additional Instructions: 


Please call your Family Physician as soon as possible to schedule a follow-up 

appointment.


Allergies/Adverse Reactions: 


Allergies





steroids Adverse Reaction (Uncoded 11/12/17 10:34)


 








Home Medications: 


Ambulatory Orders





1 [No Reported Medications]  11/12/17

## 2017-11-13 VITALS — TEMPERATURE: 98 F | SYSTOLIC BLOOD PRESSURE: 104 MMHG | DIASTOLIC BLOOD PRESSURE: 64 MMHG

## 2017-11-13 RX ADMIN — SODIUM CHLORIDE SCH MLS/HR: 0.9 INJECTION, SOLUTION INTRAVENOUS at 05:42

## 2017-11-13 RX ADMIN — PANTOPRAZOLE SODIUM SCH MG: 40 INJECTION, POWDER, FOR SOLUTION INTRAVENOUS at 08:56

## 2017-11-21 NOTE — PN
DATE OF SERVICE:  11/13/17



SUBJECTIVE: 

The patient was admitted with acute gastritis, gastroenteritis and dehydration. 
Nausea and vomiting is better. Hgb has dropped from the 14.7 to the 12.5. Was 
able to keep the clear liquids down. AST and ALT is better from 123 to 85. Drug 
screen was positive for marijuana. 



REVIEW OF SYSTEMS:  

CONSTITUTIONAL: No fever, no chills.  

HEENT:  Normal.

ENDOCRINE: No weight gain, no weight loss.

CVS:  No angina symptoms. No CHF symptoms.  No palpitations.  No atypical chest 
pain for CAD.  No shortness of breath. No PND, no orthopnea. 

RESPIRATORY:  No cough, no hemoptysis. 

GI:  No nausea, no vomiting.  No abdominal pain. 

:  No hematuria. No polyuria. 

MUSCULOSKELETAL:.  No joint swelling. 

PSYCHIATRIC: Not anxious. No depression. No suicidal thoughts. No homicidal 
thoughts. 

SKIN: Intact. No rash. 



PHYSICAL EXAMINATION:  

V/S: blood pressure 107/63, respiratory rate 16, heart rate 83, temperature 
98.6 with saturation 97.

GENERAL: Cachetic male laying in the bed and not in any distress. 

HEENT: Normocephalic, atraumatic. Mucosa dry. Pallor positive. No icterus. 

NECK:  Supple.  No JVD, no carotid bruit. No lymphadenopathy.

LUNGS:  Clear to auscultation. No rales or rhonchi. 

HEART:  S1, S2 normal. No S3. No murmur, gallop or regurgitation. 

ABDOMEN: Soft, epigastric discomfort present. Bowel sounds active. No rigidity. 
No rebound or guarding. No CVA tenderness. 

EXTREMITIES: No clubbing, cyanosis or pedal edema. 

MUSCULOSKELETAL:  No joint swelling. 

NEUROLOGIC:  Awake, alert, oriented times three.  No focal deficit. 

LYMPHATIC: No lymph nodes palpable. 

SKIN: Intact.



LABS:

WBC 9.79, hgb 12.5, hct 35.7, plt count 179, sodium 142, potassium 3.6, 
chloride 109, bicarb 26, BUN 10, creatinine 0.81, glucose 118.



ASSESSMENT: 

1.  Acute gastritis 

2.  Dehydration 

3.  Elevated liver enzymes

4.  History of marijuana use 

5.  Anemia probably from the hemodilution versus GI bleed



PLAN:

1.  Advance diet to soft diet and advance as tolerated 

2.  Decreased IV fluids 

3.  Protonix  



TIME SPENT: More than 35 minutes
MTDD

## 2017-12-12 ENCOUNTER — HOSPITAL ENCOUNTER (OUTPATIENT)
Dept: HOSPITAL 58 - LAB | Age: 32
Discharge: HOME | End: 2017-12-12
Attending: INTERNAL MEDICINE

## 2017-12-12 VITALS — BODY MASS INDEX: 20.9 KG/M2

## 2017-12-12 DIAGNOSIS — D64.89: Primary | ICD-10-CM

## 2017-12-12 LAB
BASOPHILS # BLD AUTO: 0.1 K/UL (ref 0–0.2)
BASOPHILS NFR BLD AUTO: 0.7 % (ref 0–3)
EOSINOPHIL # BLD AUTO: 0.3 K/UL (ref 0–0.7)
EOSINOPHIL NFR BLD AUTO: 4.2 % (ref 0–7)
FERRITIN SERPL IA-MCNC: 161.43 NG/ML (ref 21.81–274.66)
FOLATE SERPL-MCNC: 8.6 NG/ML (ref 3.1–20.5)
HCT VFR BLD AUTO: 41.4 % (ref 42–52)
HGB BLD-MCNC: 14.4 G/DL (ref 14–18)
IMM GRANULOCYTES NFR BLD AUTO: 0.4 % (ref 0–5)
IMM RETICS/RBC NFR BLDCO AUTO: 4 %
IRON SERPL-MCNC: 99 UG/DL (ref 65–175)
LYMPHOCYTES # SPEC AUTO: 2.5 K/UL (ref 0.6–3.4)
LYMPHOCYTES NFR BLD AUTO: 32.9 % (ref 10–50)
MCH RBC QN: 30.8 PG (ref 27–31)
MCHC RBC AUTO-ENTMCNC: 34.8 G/DL (ref 31.8–35.4)
MCV RBC: 88.5 FL (ref 80–94)
MONOCYTES # BLD AUTO: 0.6 K/UL (ref 0.4–2)
MONOCYTES NFR BLD AUTO: 8.3 % (ref 0–10)
NEUTROPHILS # BLD AUTO: 4.1 K/UL (ref 2–6.9)
NEUTROPHILS NFR BLD AUTO: 53.5 %
PLATELET # BLD AUTO: 183 10^3/UL (ref 140–440)
RBC # BLD AUTO: 4.68 10^6/UL (ref 4.7–6.1)
RETICS/RBC NFR: 1.18 %
TIBC SERPL-MCNC: 263 UG/DL (ref 240–450)
WBC # BLD AUTO: 7.67 K/UL (ref 4.2–10.2)

## 2017-12-12 PROCEDURE — 83540 ASSAY OF IRON: CPT

## 2017-12-12 PROCEDURE — 82746 ASSAY OF FOLIC ACID SERUM: CPT

## 2017-12-12 PROCEDURE — 36415 COLL VENOUS BLD VENIPUNCTURE: CPT

## 2017-12-12 PROCEDURE — 82728 ASSAY OF FERRITIN: CPT

## 2017-12-12 PROCEDURE — 85025 COMPLETE CBC W/AUTO DIFF WBC: CPT

## 2017-12-12 PROCEDURE — 83550 IRON BINDING TEST: CPT

## 2017-12-12 PROCEDURE — 82607 VITAMIN B-12: CPT

## 2017-12-12 PROCEDURE — 85045 AUTOMATED RETICULOCYTE COUNT: CPT

## 2017-12-12 PROCEDURE — 84466 ASSAY OF TRANSFERRIN: CPT

## 2017-12-12 NOTE — HP
DATE OF SERVICE: 11/12/17



CHIEF COMPLAINT:  Nausea, vomiting, not able to keep anything down.  She is 



HISTORY OF PRESENT ILLNESS:  He has complaints of nausea, vomiting with chills 
and diarrhea since two o'clock in the morning.  She had some supper outside and 
started having nausea and is not able to keep anything down not even water or 
whatever he was eating.  Otherwise, no bloody and nonbilious.  He was having 
watery diarrhea.  No blood in the diarrhea.  He came to the emergency room and 
was seen by  in the emergency room.  He had CT of the abdomen and 
pelvis.  Ct of the chest did not show any acute findings.  Lactic acid was 
elevated.  White count was 13,000. Even after given IV fluids and Zofran, the 
patient was still not able to keep anything down.  At that time, the patient is 
admitted to the hospital for acute food poisoning and gastroenteritis and 
dehydration. 



REVIEW OF SYSTEMS: 

CONSTITUTIONAL:  Weakness, tiredness.  No fever, no chills.

HEENT: Normal. 

ENDOCRINE: No weight gain; no weight loss.

CVS:  No chest pain. No PND, no orthopnea.  No shortness of breath. No PND, no 
orthopnea.

RESPIRATORY:  No cough, no congestion. No hemoptysis. 

GI: Nausea, vomiting, abdominal cramps. No melena. Not able to keep anything 
down.

: No hematuria. No polyuria. 

MUSCULOSKELETAL: No joint swelling.  

PSYCHIATRIC: Not anxious. No depression.  No suicidal thoughts. No homicidal 
thoughts.

SKIN: Intact, no open lesions. 



PAST MEDICAL HISTORY:  

Anxiety

Substance use/occasional marijuana

Alcohol occasionally



PAST SURGICAL HISTORY:

Tonsillectomy

Appendectomy



PERSONAL HISTORY:  Does smoke.



FAMILY HISTORY:  Significant for the heart problems.



MEDICATIONS:  None.



ALLERGIES: Allergic to steroids.



PHYSICAL EXAMINATION:  Sick looking man lying in the bed.  Not in distress. The 
patient looks dehydrated.

V/S: Blood pressure 107/63, respiratory rate 18, heart rate 74, temperature 98.6
, saturation 98.

HEENT:  Atraumatic, normocephalic. No scleral icterus.   

NECK:   Supple. No JVD, no bruit. No lymphadenopathy. No thyromegaly. 

HEART:  S1, S2 normal. No murmur.  No cyanosis or clubbing. No ascites. 

LUNGS:  Clear to auscultation.  No rales or rhonchi. 

ABDOMEN: Epigastric tenderness.  Bowel sounds are hyperactive. No CVA 
tenderness. No rigidity or guarding. 

EXTREMITIES:  No cyanosis, clubbing or pedal edema.  

MUSCULOSKELETAL: Normal joints, no swelling. 

NEUROLOGIC: Normal. 

SKIN:  Intact; no open lesions. 

LYMPHATIC: No lymph nodes palpable. 



LABS:  White count 13.89, hemoglobin 14.7, hematocrit 42.2, platelet count 210, 
sodium 144, potassium 4.0, chloride 103, bicarb 30, BUN 12, creatinine 0.99, 
glucose 118, lactic acid 22.7, AST 50, .



ASSESSMENT:

1.  NAUSEA, VOMITING.  ACUTE FOOD POISONING.

2.  ELEVATED LEUKOCYTES

3.  DEHYDRATION



PLAN:

1. Admit the patient to the regular floor.

2.  CBC, CMP today and daily.

3.  NPO.

4.  IV fluids.

5.  Zofran prn.

6.  Protonix.

7.  I will follow up with the patient in daily rounds.



Time spent with the patient is more than 70 minutes today.





MTDD

## 2018-01-19 NOTE — DS
DATE OF SERVICE:  11/13/17



FINAL DIAGNOSIS: 

1.  ACUTE GASTRITIS

2.  DEHYDRATION

3.  ELEVATED LIVER ENZYMES

4.  HISTORY OF MARIJUANA USE

5.  ANEMIA, PROBABLY FROM THE HEMODILUTION



PLAN:

1.  Discharge the patient home.

2.  Medications sent to [facility] of Zofran 4 mg twice a day for five days.

3.  Soft diet.  

4.  Increase hydration.

5.  No greasy food.

6.  Follow up with [facility] within 4 to 5 days.



DISEASE SPECIFIC EDUCATION:  About the gastritis, dehydration was discussed.  
He verbalized understanding.



HOSPITAL COURSE:  Shayne Curry, who is a 32 year old male, came to the 
emergency room with nausea, vomiting, chills, and diarrhea since the morning.  
He had been trying to drink and he immediately throws it back up.  He was seen 
by Dr. Barajas in the emergency room.  Flu was negative.  White count was 13.89, 
glucose 118, slightly elevated AST 50 and .  Lactic acid was 22.7.  
Urinalysis was negative.  Toxicology screen was positive for Cannibis.  CT of 
the abdomen and pelvis done which showed no acute intraabdominal pathology.  
Status post appendectomy.  Gallbladder was normal.  CT of the chest was done, 
as the patient was having some epigastric discomfort.  Minimal granulomatous 
changes.  No acute findings.  Ultrasound of the abdomen was also done and it 
showed no liver pathology, which was normal.  IV fluids, Zofran was given.  
With the given treatment, the patient started feeling better.  Protonix did 
help him and the Zofran did help him.  He was then started on the clear liquids 
and was able to keep it down.  As the patient was keeping the clear liquids down
, the patient was discharged to home.  By the next day the white count was 
towards normal.  No fever.  He was advised to keep to a soft diet for 3 to 4 
days.



TIME SPENT: MORE THAN 65 MINUTES
MTDD

## 2019-03-27 ENCOUNTER — HOSPITAL ENCOUNTER (OUTPATIENT)
Dept: HOSPITAL 58 - RHC-LAB | Age: 34
Discharge: HOME | End: 2019-03-27
Attending: NURSE PRACTITIONER

## 2019-03-27 VITALS — BODY MASS INDEX: 20.9 KG/M2

## 2019-03-27 DIAGNOSIS — F41.1: Primary | ICD-10-CM

## 2019-03-27 DIAGNOSIS — Z83.2: ICD-10-CM

## 2019-03-27 PROCEDURE — 84443 ASSAY THYROID STIM HORMONE: CPT

## 2019-03-27 PROCEDURE — 85025 COMPLETE CBC W/AUTO DIFF WBC: CPT

## 2019-03-27 PROCEDURE — 80053 COMPREHEN METABOLIC PANEL: CPT

## 2019-03-27 PROCEDURE — 36415 COLL VENOUS BLD VENIPUNCTURE: CPT

## 2019-04-04 ENCOUNTER — HOSPITAL ENCOUNTER (EMERGENCY)
Dept: HOSPITAL 58 - ED | Age: 34
Discharge: PSYCHIATRIC OR SUBSTANCE USE FACILITY | End: 2019-04-04

## 2019-04-04 VITALS — BODY MASS INDEX: 22.4 KG/M2

## 2019-04-04 VITALS — DIASTOLIC BLOOD PRESSURE: 89 MMHG | SYSTOLIC BLOOD PRESSURE: 132 MMHG

## 2019-04-04 VITALS — TEMPERATURE: 98.2 F

## 2019-04-04 DIAGNOSIS — E86.0: ICD-10-CM

## 2019-04-04 DIAGNOSIS — K52.9: Primary | ICD-10-CM

## 2019-04-04 DIAGNOSIS — F17.210: ICD-10-CM

## 2019-04-04 PROCEDURE — 87040 BLOOD CULTURE FOR BACTERIA: CPT

## 2019-04-04 PROCEDURE — 80306 DRUG TEST PRSMV INSTRMNT: CPT

## 2019-04-04 PROCEDURE — 81001 URINALYSIS AUTO W/SCOPE: CPT

## 2019-04-04 PROCEDURE — 96365 THER/PROPH/DIAG IV INF INIT: CPT

## 2019-04-04 PROCEDURE — 82150 ASSAY OF AMYLASE: CPT

## 2019-04-04 PROCEDURE — 96361 HYDRATE IV INFUSION ADD-ON: CPT

## 2019-04-04 PROCEDURE — 85025 COMPLETE CBC W/AUTO DIFF WBC: CPT

## 2019-04-04 PROCEDURE — 96374 THER/PROPH/DIAG INJ IV PUSH: CPT

## 2019-04-04 PROCEDURE — 80053 COMPREHEN METABOLIC PANEL: CPT

## 2019-04-04 PROCEDURE — 96375 TX/PRO/DX INJ NEW DRUG ADDON: CPT

## 2019-04-04 PROCEDURE — 99283 EMERGENCY DEPT VISIT LOW MDM: CPT

## 2019-04-04 PROCEDURE — 36415 COLL VENOUS BLD VENIPUNCTURE: CPT

## 2019-04-04 PROCEDURE — 96372 THER/PROPH/DIAG INJ SC/IM: CPT

## 2019-04-04 PROCEDURE — 83735 ASSAY OF MAGNESIUM: CPT

## 2019-04-04 NOTE — CT
EXAM:  CT ABDOMEN AND PELVIS 

  

HISTORY:  Abdominal pain 

  

TECHNIQUE:  CT abdomen and pelvis without intravenous contrast.  Images were reconstructed using 5 mm
 section thickness.  Reformations were prepared. 

COMPARISON: 11/12/2017 

  

FINDINGS: 

  

Diagnostic limitations may exist without including contrast enhanced images.  The liver, spleen, gall
bladder, pancreas, adrenal glands, kidneys and ureters appear normal.  Minimal vascular calcification
. 

  

No appendix is identified.  Apparent surgical clips near the cecal tip.  There is fluid consistency s
tool within the rectum consistent with diarrhea.  There is no current CT evidence of active enterocol
itis or bowel obstruction.  Urinary bladder and prostate appear normal.  There is no ascites or infla
mmatory infiltration of the abdominal fat. 

  

Ventral abdominal wall is intact without herniation.  Bones appear appropriate for age.  Lung bases a
re clear.  There is no pneumoperitoneum. 

  

IMPRESSION:    There is fluid consistency stool within the rectum consistent with diarrhea.  There is
 no current CT evidence of active enterocolitis or bowel obstruction.  Otherwise grossly unremarkable
 exam.

## 2019-04-04 NOTE — ED.PDOC
General


ED Provider: 


Dr. RADHA GONZALEZ





Chief Complaint: Nausea/Vomiting


Stated Complaint: Severe abdomninal cramping and pain with nausea,vomiting and 

diarrhea.  Sudden onset this morning.


Time Seen by Physician: 12:15


Mode of Arrival: Walk-In


Information Source: Patient


Exam Limitations: No limitations


Primary Care Provider: 


KRISTIN BURKETT





Nursing and Triage Documentation Reviewed and Agree: Yes


Does patient meet sepsis criteria?: No


System Inflammatory Response Syndrome: Not Applicable


Sepsis Protocol: 


For patient's 13 years and over:





Temp is 96.8 and below  and greater


Pulse >90 BPM


Resp >20/minute


Acutely Altered Mental Status





Are patient's symptoms suggestive of a new infection, such as:


   -Pneumonia


   -Skin, Soft Tissue


   -Endocarditis


   -UTI


   -Bone, Joint Infection


   -Implantable Device


   -Acute Abdominal Infection


   -Wound Infection


   -Meningitis


   -Blood Stream Catheter Infection


   -Unknown








GI Complaint Exam





- Vomiting/Diarrhea Complaint/Exam


Onset/Duration: earlier this morning-awakened with severe nausea followed by 

onset of vomii





Review of Systems





- Review Of Systems


Constitutional: Reports: No symptoms


Eyes: Reports: No symptoms


Ears, Nose, Mouth, Throat: Reports: No symptoms


Respiratory: Reports: No symptoms


Cardiac: Reports: No symptoms


GI: Reports: Diarrhea, Nausea, Vomiting


: Reports: No symptoms


Musculoskeletal: Reports: No symptoms


Skin: Reports: No symptoms


Neurological: Reports: No symptoms


Endocrine: Reports: No symptoms


Hematologic/Lymphatic: Reports: No symptoms


All Other Systems: Reviewed and Negative





Past Medical History





- Past Medical History


Previously Healthy: Yes


Endocrine: Reports: None


Cardiovascular: Reports: None


Respiratory: Reports: None


Hematological: Reports: None


Gastrointestinal: Reports: None


Genitourinary: Reports: None


Neuro/Psych: Reports: None


Musculoskeletal: Reports: None


Cancer: Reports: None





- Surgical History


General Surgical History: Reports: None





- Family History


Family History: Reports: Unknown





- Social History


Smoking Status: Former smoker, Light tobacco smoker


Hx Substance Use: No


Alcohol Screening: None





Physical Exam





- Physical Exam


Appearance: Ill-appearing


Ill-appearing: Mild


Pain Distress: None


Eyes: MELISSA, EOMI, Conjunctiva clear


ENT: Ears normal, Nose normal, Oropharynx normal


Neck: Supple


Respiratory: Airway patent, Breath sounds clear, Breath sounds equal, 

Respirations nonlabored


Cardiovascular: RRR, Pulses normal, No rub, No murmur


GI/: Soft, No masses, Tender, Bowel sounds hypoactive


Musculoskeletal: Normal strength, ROM intact, No edema, No calf tenderness


Skin: Warm, Dry, Normal color


Neurological: Sensation intact, Motor intact, Reflexes intact, Cranial nerves 

intact, Alert, Oriented


Psychiatric: Affect appropriate, Mood appropriate





Critical Care Note





- Critical Care Note


Total Time (mins): 60





Course





- Course


Hematology/Chemistry: 


 04/04/19 13:00





 04/04/19 13:00


Orders, Labs, Meds: 


Lab Review











  04/04/19 04/04/19 04/04/19





  13:00 13:00 15:10


 


WBC  18.97 H  


 


RBC  5.74  


 


Hgb  17.2  


 


Hct  50.8  


 


MCV  88.5  


 


MCH  30.0  


 


MCHC  33.9  


 


RDW Coeff of Anjali  13.3  


 


Plt Count  222  


 


Immature Gran % (Auto)  0.6  


 


Neut % (Auto)  88.5  


 


Lymph % (Auto)  6.3 L  


 


Mono % (Auto)  3.7  


 


Eos % (Auto)  0.6  


 


Baso % (Auto)  0.3  


 


Immature Gran # (Auto)  0.1  


 


Neut # (Auto)  16.8 H  


 


Lymph # (Auto)  1.2  


 


Mono # (Auto)  0.7  


 


Eos # (Auto)  0.1  


 


Baso # (Auto)  0.1  


 


Sodium   141.6 


 


Potassium   4.59 


 


Chloride   104.0 


 


Carbon Dioxide   22.9 


 


Anion Gap   19.29 


 


BUN   12.1 


 


Creatinine   1.16 H 


 


Estimated GFR (MDRD)   73.00 


 


BUN/Creatinine Ratio   10.43 


 


Glucose   108.5 H 


 


Calcium   10.51 H 


 


Magnesium   1.65 


 


Total Bilirubin   0.54 


 


AST   36.8 


 


ALT   40.1 


 


Alkaline Phosphatase   102.4 


 


Total Protein   8.96 H 


 


Albumin   5.78 H 


 


Globulin   3.18 


 


Albumin/Globulin Ratio   1.81 


 


Amylase   110.0 


 


Urine Color   


 


Urine Clarity   


 


Urine pH   


 


Ur Specific Gravity   


 


Urine Protein   


 


Urine Glucose (UA)   


 


Urine Ketones   


 


Urine Blood   


 


Urine Nitrite   


 


Urine Bilirubin   


 


Urine Urobilinogen   


 


Ur Leukocyte Esterase   


 


Ur Squamous Epith Cells   


 


Urine Mucus   


 


Urine Opiates Screen    Negative


 


Ur Oxycodone Screen    Negative


 


Urine Methadone Screen    Negative


 


Ur Propoxyphene Screen    Negative


 


Ur Barbiturates Screen    Negative


 


U Tricyclic Antidepress    Negative


 


Ur Phencyclidine Scrn    Negative


 


Ur Amphetamine Screen    Negative


 


U Methamphetamines Scrn    Negative


 


U Benzodiazepines Scrn    Negative


 


Urine Cocaine Screen    Negative


 


U Cannabinoids Screen    Positive














  04/04/19





  15:10


 


WBC 


 


RBC 


 


Hgb 


 


Hct 


 


MCV 


 


MCH 


 


MCHC 


 


RDW Coeff of Anjali 


 


Plt Count 


 


Immature Gran % (Auto) 


 


Neut % (Auto) 


 


Lymph % (Auto) 


 


Mono % (Auto) 


 


Eos % (Auto) 


 


Baso % (Auto) 


 


Immature Gran # (Auto) 


 


Neut # (Auto) 


 


Lymph # (Auto) 


 


Mono # (Auto) 


 


Eos # (Auto) 


 


Baso # (Auto) 


 


Sodium 


 


Potassium 


 


Chloride 


 


Carbon Dioxide 


 


Anion Gap 


 


BUN 


 


Creatinine 


 


Estimated GFR (MDRD) 


 


BUN/Creatinine Ratio 


 


Glucose 


 


Calcium 


 


Magnesium 


 


Total Bilirubin 


 


AST 


 


ALT 


 


Alkaline Phosphatase 


 


Total Protein 


 


Albumin 


 


Globulin 


 


Albumin/Globulin Ratio 


 


Amylase 


 


Urine Color  Yellow


 


Urine Clarity  Clear


 


Urine pH  5.5


 


Ur Specific Gravity  1.025


 


Urine Protein  2+


 


Urine Glucose (UA)  Negative


 


Urine Ketones  Negative


 


Urine Blood  Negative


 


Urine Nitrite  Negative


 


Urine Bilirubin  Negative


 


Urine Urobilinogen  0.2


 


Ur Leukocyte Esterase  Negative


 


Ur Squamous Epith Cells  Not present


 


Urine Mucus  2+


 


Urine Opiates Screen 


 


Ur Oxycodone Screen 


 


Urine Methadone Screen 


 


Ur Propoxyphene Screen 


 


Ur Barbiturates Screen 


 


U Tricyclic Antidepress 


 


Ur Phencyclidine Scrn 


 


Ur Amphetamine Screen 


 


U Methamphetamines Scrn 


 


U Benzodiazepines Scrn 


 


Urine Cocaine Screen 


 


U Cannabinoids Screen 








Orders











 Category Date Time Status


 


 IV [ED IV/MEDIPORT/POWERPORT] .ONCE EMERGENCY  04/04/19 12:40 Active


 


 AMYLASE Stat LAB  04/04/19 13:00 Completed


 


 BLOOD CULTURE (ED ONLY) Stat LAB  04/04/19 13:00 Results


 


 CBC W/ AUTO DIFF Stat LAB  04/04/19 13:00 Completed


 


 CMP [COMPREHENSIVE METABOLIC PANEL] Stat LAB  04/04/19 13:00 Completed


 


 MAGNESIUM Stat LAB  04/04/19 13:00 Completed


 


 UA [URINALYSIS C & S IF INDICATED] Stat LAB  04/04/19 15:10 Completed


 


 URINE DRUG SCREEN (RAPID FOR ED) [DRUG SCREEN, URINE, LAB  04/04/19 15:10 

Completed





 RAPID] Stat   


 


 0.9 % Sodium Chloride [Saline Flush] MEDS  04/04/19 12:40 Discontinued





 1 syr IVF PRN PRN   


 


 Famotidine Inj [Pepcid] MEDS  04/04/19 17:32 Discontinued





 20 mg IVP ONCE STA   


 


 Ondansetron HCl/Pf [Zofran 4 mg/2 ml] MEDS  04/04/19 12:44 Discontinued





 4 mg IVP ONCE STA   


 


 Promethazine HCl [Phenergan 25 mg/ml Vial] MEDS  04/04/19 16:32 Discontinued





 25 mg IM ONCE STA   


 


 Sodium Chloride 0.9% [Sodium Chloride] 1,000 ml MEDS  04/04/19 12:40 

Discontinued





 IV BOLUS   


 


 Sodium Chloride 0.9% [Sodium Chloride] 1,000 ml MEDS  04/04/19 17:31 

Discontinued





 IV BOLUS   


 


 CT ABDOMEN/PELVIS WO CONTRAST Stat RADS  04/04/19 12:41 Completed








Medications














Discontinued Medications














Generic Name Dose Route Start Last Admin





  Trade Name Freq  PRN Reason Stop Dose Admin


 


Famotidine  20 mg  04/04/19 17:32  04/04/19 17:41





  Pepcid  IVP  04/04/19 17:33  20 mg





  ONCE STA   Administration





     





     





     





     


 


Sodium Chloride  1,000 mls @ 500 mls/hr  04/04/19 12:40  04/04/19 13:07





  Sodium Chloride  IV  04/04/19 14:39  500 mls/hr





  BOLUS STA   Administration





     





     





     





     


 


Sodium Chloride  1,000 mls @ 1,000 mls/hr  04/04/19 17:31  04/04/19 17:40





  Sodium Chloride  IV  04/04/19 18:30  1,000 mls/hr





  BOLUS STA   Administration





     





     





     





     


 


Ondansetron HCl  4 mg  04/04/19 12:44  04/04/19 13:07





  Zofran 4 Mg/2 Ml  IVP  04/04/19 12:45  4 mg





  ONCE STA   Administration





     





     





     





     


 


Promethazine HCl  25 mg  04/04/19 16:32  04/04/19 16:59





  Phenergan 25 Mg/Ml Vial  IM  04/04/19 16:33  25 mg





  ONCE STA   Administration





     





     





     





     


 


Sodium Chloride  1 syr  04/04/19 12:40  04/04/19 17:31





  Saline Flush  IVF   1 syr





  PRN PRN   Administration





  To flush IV   





     





     





     











Vital Signs: 


 











  Temp Pulse Resp BP Pulse Ox


 


 04/04/19 17:50  98.2 F    


 


 04/04/19 11:39  97.9 F  82  20  132/89  97














Departure





- Departure


Time of Disposition: 18:30


Disposition: TSF TO PSYCH HOSP/UNIT


Discharge Problem: 


 Gastroenteritis, Dehydration





Instructions:  Dehydration (ED), Gastroenteritis (ED)


Condition: Good


Pt referred to PMD for follow-up: Yes (See provider in next week)


IPMP verified?: No


Additional Instructions: 


Follow up with your PCP 


Take medication as prescribed 


Remain well hydrated and resume daily activities  per toleranc


Prescriptions: 


Ondansetron [Zofran Odt] 4 mg PO Q8H #10 tab.rapdis


Allergies/Adverse Reactions: 


Allergies





steroids Adverse Reaction (Uncoded 04/04/19 11:45)


 


 anxiety 








Home Medications: 


Ambulatory Orders





Ondansetron [Zofran Odt] 4 mg PO Q8H #10 tab.rapdis 04/04/19 








Disposition Discussed With: Patient


Additional Information: 





1700


Intially feeling well and improved after infusion of IV fluids; Was able to 

take small amounts of water  however after 30 min patient experience recurrent  

N-V-D


Feeling bad and weak again


Additional IV fluids were initiated and administered.


After infusion patient feeling much better. 


Given small amounts of clear liquids/tolerated well without recurrent symptoms 





1830


Feeling much better. Ready for discharge to home


Sipping on clear liquids

## 2019-04-29 ENCOUNTER — HOSPITAL ENCOUNTER (EMERGENCY)
Dept: HOSPITAL 58 - ED | Age: 34
Discharge: LEFT BEFORE DISCHARGE | End: 2019-04-29

## 2019-04-29 VITALS — SYSTOLIC BLOOD PRESSURE: 145 MMHG | TEMPERATURE: 98.5 F | DIASTOLIC BLOOD PRESSURE: 91 MMHG

## 2019-04-29 VITALS — BODY MASS INDEX: 21.3 KG/M2

## 2019-04-29 DIAGNOSIS — R07.9: Primary | ICD-10-CM

## 2019-04-29 PROCEDURE — 84484 ASSAY OF TROPONIN QUANT: CPT

## 2019-04-29 PROCEDURE — 85025 COMPLETE CBC W/AUTO DIFF WBC: CPT

## 2019-04-29 PROCEDURE — 85610 PROTHROMBIN TIME: CPT

## 2019-04-29 PROCEDURE — 93010 ELECTROCARDIOGRAM REPORT: CPT

## 2019-04-29 PROCEDURE — 80053 COMPREHEN METABOLIC PANEL: CPT

## 2019-04-29 PROCEDURE — 99284 EMERGENCY DEPT VISIT MOD MDM: CPT

## 2019-04-29 PROCEDURE — 93005 ELECTROCARDIOGRAM TRACING: CPT

## 2019-04-29 PROCEDURE — 85730 THROMBOPLASTIN TIME PARTIAL: CPT

## 2019-04-29 PROCEDURE — 82550 ASSAY OF CK (CPK): CPT

## 2019-04-29 PROCEDURE — 36415 COLL VENOUS BLD VENIPUNCTURE: CPT

## 2019-04-29 PROCEDURE — 85379 FIBRIN DEGRADATION QUANT: CPT

## 2019-04-29 NOTE — DI
EXAM:  Two views of the chest. 

  

History:  Chest pain. 

  

Comparison:  Chest radiograph 09/21/2017 

  

Findings:  Heart size is normal.  No focal consolidation.  No appreciable pleural fluid and no pneumo
thorax.  No acute osseous abnormalities. 

  

Impression:  No acute cardiopulmonary process

## 2019-04-29 NOTE — ED.PDOC
General


ED Provider: 


Dr. GERALDO BARRAGAN





Chief Complaint: Chest Pain


Stated Complaint: chest pain


Time Seen by Physician: 08:00 (RN PRESENT AT ALL TIMES RICHARD PRESENT FROM RESP 

DEPT)


Mode of Arrival: Walk-In


Information Source: Patient


Exam Limitations: No limitations


Primary Care Provider: 


ZAIDA RUBIO





Nursing and Triage Documentation Reviewed and Agree: Yes


Does patient meet sepsis criteria?: No


System Inflammatory Response Syndrome: Not Applicable


Sepsis Protocol: 


For patient's 13 years and over:





Temp is 96.8 and below  and greater


Pulse >90 BPM


Resp >20/minute


Acutely Altered Mental Status





Are patient's symptoms suggestive of a new infection, such as:


   -Pneumonia


   -Skin, Soft Tissue


   -Endocarditis


   -UTI


   -Bone, Joint Infection


   -Implantable Device


   -Acute Abdominal Infection


   -Wound Infection


   -Meningitis


   -Blood Stream Catheter Infection


   -Unknown








Cardiovascular Complaint Exam





- Chest Pain Complaint/Exam


Onset: Sudden


Duration: 6 AM WOKE PT UP WITH 10/10  CHEST PAIN 


Symptoms Are: Resolved


Length of Chest Pain Episodes: PT IS UNSURE 


Initial Severity: Severe


Current Severity: None


Location: Reports: Midsternal


Pain Radiates: Reports: None


Character: Reports: Aching


Aggravating: Reports: None


Alleviating: Reports: None


Associated Signs and Symptoms: Reports: Diaphoresis.  Denies: Nausea, Vomiting, 

Fever, Palpitations, Cough, Hemoptysis, Back pain, Abdominal pain, Dizziness, 

Short of air, Calf pain, Calf swelling


Related Surgical History: Reports: None


History of Healthcare-Acquired Pneumonia: Reports: No


AMI/ACS Risk Factors: Reports: None


TAD Risk Factors: Reports: None


Pulmonary Embolism Risk Factors: Reports: None


Prior Care for this Complaint: No


Recent Stress Test: No


Recent Echo/LV Function: No


JVD Present: No


Subcutaneous Emphysema Present: No


Diminshed Breath Sounds: No


Reproducible Chest Wall Pain: No


Bilateral Pulses Present: No


Unequal Pulses Noted: No


If Risk Factors for AMI/ACS Consider: EKG, Cardiac Enzymes


Differential Diagnoses: Unstable Angina, GI Diseasae, Lower Resp. Infection


Quality Indicator For Non-Traumatic Chest Pain/Syncope: EKG Performed





Review of Systems





- Review Of Systems


Constitutional: Reports: No symptoms


Eyes: Reports: No symptoms


Ears, Nose, Mouth, Throat: Reports: No symptoms


Respiratory: Reports: No symptoms


Cardiac: Reports: Chest pain


GI: Reports: No symptoms


: Reports: No symptoms


Musculoskeletal: Reports: No symptoms


Skin: Reports: No symptoms


Neurological: Reports: No symptoms


Endocrine: Reports: No symptoms


Hematologic/Lymphatic: Reports: No symptoms


All Other Systems: Reviewed and Negative





Past Medical History





- Past Medical History


Previously Healthy: Yes


Endocrine: Reports: None


Cardiovascular: Reports: None


Respiratory: Reports: None


Hematological: Reports: None


Gastrointestinal: Reports: None


Genitourinary: Reports: None


Neuro/Psych: Reports: None


Musculoskeletal: Reports: None


Cancer: Reports: None





- Surgical History


General Surgical History: Reports: None





- Family History


Family History: Reports: Unknown





- Social History


Smoking Status: Former smoker


Hx Substance Use: No


Alcohol Screening: None





Physical Exam





- Physical Exam


Appearance: Well-appearing, No pain distress, Well-nourished


Eyes: MELISSA, EOMI, Conjunctiva clear


ENT: Ears normal, Nose normal, Oropharynx normal


Respiratory: Airway patent, Breath sounds clear, Breath sounds equal, 

Respirations nonlabored


Cardiovascular: RRR, Pulses normal, No rub, No murmur


GI/: Soft, Nontender, No masses, Bowel sounds normal, No Organomegaly


Musculoskeletal: Normal strength, ROM intact, No edema, No calf tenderness


Skin: Warm, Dry, Normal color


Neurological: Sensation intact, Motor intact, Reflexes intact, Cranial nerves 

intact, Alert, Oriented


Psychiatric: Affect appropriate, Mood appropriate





Interpretation





- Radiology Interpretation


Radiology Interpretation By: Radiologist


Radiology Results: No acute changes





- Cardiac Monitor


Rate: Normal


Rhythm: Sinus


Ectopy: None





- EKG Interpretation


Rate: Normal


Rhythm: Sinus


Ectopy: None


Axis: NL


ST Segment: Normal





Re-Evaluation





- Re-Evaluation


Time of Re-Evaluation: 09:34 (PT REFUSED ADMISSION , RISK OF STROKE MI, DEATH 

DISCUSSED AWARE AND REFUSING ADMITT)


Status: Improved


Vital Signs Stable: Yes


Pain Level: 0


Appearance: NAD


Lungs: Clear


Skin: Warm and Dry


Neuro: Alert and Oriented X3


CV: RRR


Additional Comments: RAHEEM AT BEDSIDE ADMISSION DISCUSSED LABS AND EKG 

DISCUSSED WITH THE PT. 





Critical Care Note





- Critical Care Note


Total Time (mins): 0





Course





- Course


Hematology/Chemistry: 


 04/29/19 08:28





 04/29/19 08:28


Orders, Labs, Meds: 





Lab Review











  04/29/19 04/29/19 04/29/19





  08:28 08:28 08:28


 


WBC  6.99  


 


RBC  4.71  


 


Hgb  14.4  


 


Hct  41.5 L  


 


MCV  88.1  


 


MCH  30.6  


 


MCHC  34.7  


 


RDW Coeff of Anjali  13.2  


 


Plt Count  215  


 


Immature Gran % (Auto)  0.1  


 


Neut % (Auto)  54.1  


 


Lymph % (Auto)  33.0  


 


Mono % (Auto)  9.4  


 


Eos % (Auto)  3.0  


 


Baso % (Auto)  0.4  


 


Immature Gran # (Auto)  0.0  


 


Neut # (Auto)  3.8  


 


Lymph # (Auto)  2.3  


 


Mono # (Auto)  0.7  


 


Eos # (Auto)  0.2  


 


Baso # (Auto)  0.0  


 


PT   10.8 


 


INR   1.08 


 


APTT   29.0 


 


D-Dimer (Manual)   


 


Sodium    139.6


 


Potassium    3.72


 


Chloride    106.2


 


Carbon Dioxide    25.0


 


Anion Gap    12.12


 


BUN    8.9 L


 


Creatinine    1.00


 


Estimated GFR (MDRD)    87.00


 


BUN/Creatinine Ratio    8.90


 


Glucose    98.8


 


Calcium    9.24


 


Total Bilirubin    0.69


 


AST    26.4


 


ALT    21.9


 


Alkaline Phosphatase    81.5


 


Total Creatine Kinase    92.7


 


Troponin I    < 0.012


 


Total Protein    6.95


 


Albumin    4.75


 


Globulin    2.20


 


Albumin/Globulin Ratio    2.15














  04/29/19





  08:28


 


WBC 


 


RBC 


 


Hgb 


 


Hct 


 


MCV 


 


MCH 


 


MCHC 


 


RDW Coeff of Anjali 


 


Plt Count 


 


Immature Gran % (Auto) 


 


Neut % (Auto) 


 


Lymph % (Auto) 


 


Mono % (Auto) 


 


Eos % (Auto) 


 


Baso % (Auto) 


 


Immature Gran # (Auto) 


 


Neut # (Auto) 


 


Lymph # (Auto) 


 


Mono # (Auto) 


 


Eos # (Auto) 


 


Baso # (Auto) 


 


PT 


 


INR 


 


APTT 


 


D-Dimer (Manual)  410.74


 


Sodium 


 


Potassium 


 


Chloride 


 


Carbon Dioxide 


 


Anion Gap 


 


BUN 


 


Creatinine 


 


Estimated GFR (MDRD) 


 


BUN/Creatinine Ratio 


 


Glucose 


 


Calcium 


 


Total Bilirubin 


 


AST 


 


ALT 


 


Alkaline Phosphatase 


 


Total Creatine Kinase 


 


Troponin I 


 


Total Protein 


 


Albumin 


 


Globulin 


 


Albumin/Globulin Ratio 








Orders











 Category Date Time Status


 


 EKG-(ED ONLY) Stat CARDIO  04/29/19 08:20 Completed


 


 CBC W/ AUTO DIFF Stat LAB  04/29/19 08:28 Completed


 


 COMPREHENSIVE METABOLIC PANEL Stat LAB  04/29/19 08:28 Completed


 


 CREATINE KINASE Stat LAB  04/29/19 08:28 Completed


 


 D-DIMER Stat LAB  04/29/19 08:28 Completed


 


 PARTIAL THROMBOPLASTIN TIME Stat LAB  04/29/19 08:28 Completed


 


 PT WITH INR Stat LAB  04/29/19 08:28 Completed


 


 TROPONIN I Stat LAB  04/29/19 08:28 Completed


 


 URINE DRUG SCREEN (RAPID FOR ED) [DRUG SCREEN, URINE, LAB  04/29/19 08:22 

Uncollected





 RAPID] Stat   


 


 Aspirin [Aspirin EC] MEDS  04/29/19 08:20 Discontinued





 325 mg PO ONCE STA   


 


 CHEST, 2 VIEWS PA & LAT Stat RADS  04/29/19 08:19 Completed








Medications














Discontinued Medications














Generic Name Dose Route Start Last Admin





  Trade Name Koko  PRN Reason Stop Dose Admin


 


Aspirin  325 mg  04/29/19 08:20  04/29/19 08:27





  Aspirin Ec  PO  04/29/19 08:21  325 mg





  ONCE STA   Administration





     





     





     





     











Vital Signs: 





 











  Temp Pulse Resp BP Pulse Ox


 


 04/29/19 07:59  98.5 F  82  16  145/91 H  98














PAVEL Risk Score


PAVEL Risk Score: 


Risk Score      Odds of death by 30D


      0                 0.1 (0.1-0.2)


      1                 0.3 (0.2-0.3)


      2                 0.4 (0.3-0.5)


      3                 0.7 (0.6-0.9)


      4                 1.2 (1.0-1.5)


      5                 2.2 (1.9-2.6)


      6                 3.0 (2.5-3.6)


      7                 4.8 (3.8-6.1)








Departure





- Departure


Time of Disposition: 09:33


Disposition: AMA


Discharge Problem: 


 Chest pain





Instructions:  Angina (ED), Chest Pain (DC), Chest Pain (ED), Angina (DC)


Condition: Good


Pt referred to PMD for follow-up: Yes


IPMP verified?: No


Additional Instructions: 


Please call your Family Physician as soon as possible to schedule a follow-up 

appointment.THIS PATTERN OF PAIN IS CONSISTENT WITH A POTENTIAL HEART DISEASE . 

YOU GAIN NOTHING BY LEAVING AGAINST DOCTOR'S ORDERS . YOU RUN THE RISK OF 

SUDDEN DEATH.  


YOU MUST SEEK HELP IMMIDEATELY  IF PAIN RETURN AND MAKE SURE YOU FOLLOW UP WITH 

THE CLINIC  AS SOON AS POSSIBLE. 


Allergies/Adverse Reactions: 


Allergies





steroids Adverse Reaction (Uncoded 04/29/19 07:58)


 


 anxiety 








Disposition Discussed With: Patient
